# Patient Record
Sex: MALE | Race: WHITE | Employment: OTHER | URBAN - METROPOLITAN AREA
[De-identification: names, ages, dates, MRNs, and addresses within clinical notes are randomized per-mention and may not be internally consistent; named-entity substitution may affect disease eponyms.]

---

## 2017-12-20 ENCOUNTER — HOSPITAL ENCOUNTER (OUTPATIENT)
Dept: ULTRASOUND IMAGING | Age: 67
Discharge: HOME | End: 2017-12-20
Attending: SURGERY
Payer: MEDICARE

## 2017-12-20 ENCOUNTER — HOSPITAL ENCOUNTER (OUTPATIENT)
Dept: CT IMAGING | Age: 67
End: 2017-12-20
Attending: SURGERY
Payer: MEDICARE

## 2017-12-20 DIAGNOSIS — R10.32 BILATERAL GROIN PAIN: ICD-10-CM

## 2017-12-20 DIAGNOSIS — R10.31 BILATERAL GROIN PAIN: ICD-10-CM

## 2017-12-20 PROCEDURE — 76700 US EXAM ABDOM COMPLETE: CPT

## 2018-04-24 ENCOUNTER — HOSPITAL ENCOUNTER (OUTPATIENT)
Dept: GENERAL RADIOLOGY | Age: 68
Discharge: HOME | End: 2018-04-24
Payer: MEDICARE

## 2018-04-24 DIAGNOSIS — J44.1 CHRONIC OBSTRUCTIVE PULMONARY DISEASE WITH ACUTE EXACERBATION (HCC): ICD-10-CM

## 2018-04-24 PROCEDURE — 71046 X-RAY EXAM CHEST 2 VIEWS: CPT

## 2018-04-24 NOTE — PROGRESS NOTES
I tried to contact the pt via phone, but I was unable to reach them. So, I will try him again later.

## 2018-08-20 PROBLEM — L82.0 INFLAMED SEBORRHEIC KERATOSIS: Status: ACTIVE | Noted: 2018-08-20

## 2018-08-20 PROBLEM — B07.9 VIRAL WART ON LEFT THUMB: Status: ACTIVE | Noted: 2018-08-20

## 2018-08-20 PROBLEM — B07.9 VIRAL WART ON FINGER: Status: ACTIVE | Noted: 2018-08-20

## 2019-11-12 ENCOUNTER — HOSPITAL ENCOUNTER (OUTPATIENT)
Dept: GENERAL RADIOLOGY | Age: 69
Discharge: HOME | End: 2019-11-12
Attending: FAMILY MEDICINE
Payer: MEDICARE

## 2019-11-12 DIAGNOSIS — M25.511 ACUTE PAIN OF RIGHT SHOULDER: ICD-10-CM

## 2019-11-12 PROCEDURE — 73030 X-RAY EXAM OF SHOULDER: CPT

## 2021-08-16 PROBLEM — I10 ESSENTIAL HYPERTENSION, BENIGN: Status: ACTIVE | Noted: 2021-08-16

## 2021-08-16 PROBLEM — Z95.5 S/P CORONARY ARTERY STENT PLACEMENT: Status: ACTIVE | Noted: 2021-08-16

## 2021-08-16 PROBLEM — Z01.810 PRE-OPERATIVE CARDIOVASCULAR EXAMINATION: Status: ACTIVE | Noted: 2021-08-16

## 2021-08-16 PROBLEM — Z72.0 TOBACCO ABUSE: Status: ACTIVE | Noted: 2021-08-16

## 2022-03-18 PROBLEM — L82.0 INFLAMED SEBORRHEIC KERATOSIS: Status: ACTIVE | Noted: 2018-08-20

## 2022-03-19 PROBLEM — Z01.810 PRE-OPERATIVE CARDIOVASCULAR EXAMINATION: Status: ACTIVE | Noted: 2021-08-16

## 2022-03-19 PROBLEM — I10 ESSENTIAL HYPERTENSION, BENIGN: Status: ACTIVE | Noted: 2021-08-16

## 2022-03-19 PROBLEM — Z72.0 TOBACCO ABUSE: Status: ACTIVE | Noted: 2021-08-16

## 2022-03-19 PROBLEM — B07.9 VIRAL WART ON LEFT THUMB: Status: ACTIVE | Noted: 2018-08-20

## 2022-03-19 PROBLEM — B07.9 VIRAL WART ON FINGER: Status: ACTIVE | Noted: 2018-08-20

## 2022-03-19 PROBLEM — Z95.5 S/P CORONARY ARTERY STENT PLACEMENT: Status: ACTIVE | Noted: 2021-08-16

## 2022-05-02 ENCOUNTER — HOSPITAL ENCOUNTER (OUTPATIENT)
Dept: MRI IMAGING | Age: 72
Discharge: HOME | End: 2022-05-02
Attending: PHYSICIAN ASSISTANT
Payer: MEDICARE

## 2022-05-02 DIAGNOSIS — H93.11 TINNITUS OF RIGHT EAR: ICD-10-CM

## 2022-05-02 DIAGNOSIS — H90.3 SENSORINEURAL HEARING LOSS, ASYMMETRICAL: ICD-10-CM

## 2022-05-02 PROCEDURE — 70553 MRI BRAIN STEM W/O & W/DYE: CPT

## 2022-05-02 PROCEDURE — A9576 INJ PROHANCE MULTIPACK: HCPCS | Performed by: PHYSICIAN ASSISTANT

## 2022-05-02 PROCEDURE — 74011250636 HC RX REV CODE- 250/636: Performed by: PHYSICIAN ASSISTANT

## 2022-05-02 RX ORDER — SODIUM CHLORIDE 0.9 % (FLUSH) 0.9 %
10 SYRINGE (ML) INJECTION
Status: COMPLETED | OUTPATIENT
Start: 2022-05-02 | End: 2022-05-02

## 2022-05-02 RX ADMIN — Medication 10 ML: at 08:30

## 2022-05-02 RX ADMIN — GADOTERIDOL 15 ML: 279.3 INJECTION, SOLUTION INTRAVENOUS at 08:29

## 2022-08-18 ENCOUNTER — OFFICE VISIT (OUTPATIENT)
Dept: FAMILY MEDICINE CLINIC | Facility: CLINIC | Age: 72
End: 2022-08-18
Payer: MEDICARE

## 2022-08-18 VITALS
TEMPERATURE: 97.8 F | HEIGHT: 70 IN | SYSTOLIC BLOOD PRESSURE: 92 MMHG | DIASTOLIC BLOOD PRESSURE: 76 MMHG | BODY MASS INDEX: 17.61 KG/M2 | HEART RATE: 72 BPM | OXYGEN SATURATION: 96 % | WEIGHT: 123 LBS

## 2022-08-18 DIAGNOSIS — I71.40 ABDOMINAL AORTIC ANEURYSM (AAA) 3.0 CM TO 5.5 CM IN DIAMETER IN MALE: ICD-10-CM

## 2022-08-18 DIAGNOSIS — J44.9 CHRONIC OBSTRUCTIVE PULMONARY DISEASE, UNSPECIFIED COPD TYPE (HCC): ICD-10-CM

## 2022-08-18 DIAGNOSIS — Z72.0 TOBACCO ABUSE: ICD-10-CM

## 2022-08-18 DIAGNOSIS — E78.2 MIXED HYPERLIPIDEMIA: ICD-10-CM

## 2022-08-18 DIAGNOSIS — I10 ESSENTIAL HYPERTENSION, BENIGN: Primary | ICD-10-CM

## 2022-08-18 DIAGNOSIS — R63.4 ABNORMAL WEIGHT LOSS: ICD-10-CM

## 2022-08-18 DIAGNOSIS — I25.10 CORONARY ARTERY DISEASE INVOLVING NATIVE CORONARY ARTERY OF NATIVE HEART WITHOUT ANGINA PECTORIS: ICD-10-CM

## 2022-08-18 LAB
ALBUMIN SERPL-MCNC: 3.8 G/DL (ref 3.2–4.6)
ALBUMIN/GLOB SERPL: 1.1 {RATIO} (ref 1.2–3.5)
ALP SERPL-CCNC: 128 U/L (ref 50–136)
ALT SERPL-CCNC: 17 U/L (ref 12–65)
ANION GAP SERPL CALC-SCNC: 5 MMOL/L (ref 7–16)
AST SERPL-CCNC: 14 U/L (ref 15–37)
BASOPHILS # BLD: 0.1 K/UL (ref 0–0.2)
BASOPHILS NFR BLD: 1 % (ref 0–2)
BILIRUB SERPL-MCNC: 1.1 MG/DL (ref 0.2–1.1)
BUN SERPL-MCNC: 9 MG/DL (ref 8–23)
CALCIUM SERPL-MCNC: 9.2 MG/DL (ref 8.3–10.4)
CHLORIDE SERPL-SCNC: 99 MMOL/L (ref 98–107)
CHOLEST SERPL-MCNC: 117 MG/DL
CO2 SERPL-SCNC: 32 MMOL/L (ref 21–32)
CREAT SERPL-MCNC: 0.9 MG/DL (ref 0.8–1.5)
DIFFERENTIAL METHOD BLD: NORMAL
EOSINOPHIL # BLD: 0.1 K/UL (ref 0–0.8)
EOSINOPHIL NFR BLD: 2 % (ref 0.5–7.8)
ERYTHROCYTE [DISTWIDTH] IN BLOOD BY AUTOMATED COUNT: 13.8 % (ref 11.9–14.6)
GLOBULIN SER CALC-MCNC: 3.6 G/DL (ref 2.3–3.5)
GLUCOSE SERPL-MCNC: 82 MG/DL (ref 65–100)
HCT VFR BLD AUTO: 48.7 % (ref 41.1–50.3)
HDLC SERPL-MCNC: 42 MG/DL (ref 40–60)
HDLC SERPL: 2.8 {RATIO}
HGB BLD-MCNC: 16.2 G/DL (ref 13.6–17.2)
IMM GRANULOCYTES # BLD AUTO: 0 K/UL (ref 0–0.5)
IMM GRANULOCYTES NFR BLD AUTO: 0 % (ref 0–5)
LDLC SERPL CALC-MCNC: 63.2 MG/DL
LYMPHOCYTES # BLD: 1.8 K/UL (ref 0.5–4.6)
LYMPHOCYTES NFR BLD: 23 % (ref 13–44)
MCH RBC QN AUTO: 29.7 PG (ref 26.1–32.9)
MCHC RBC AUTO-ENTMCNC: 33.3 G/DL (ref 31.4–35)
MCV RBC AUTO: 89.4 FL (ref 79.6–97.8)
MONOCYTES # BLD: 0.6 K/UL (ref 0.1–1.3)
MONOCYTES NFR BLD: 8 % (ref 4–12)
NEUTS SEG # BLD: 5.2 K/UL (ref 1.7–8.2)
NEUTS SEG NFR BLD: 66 % (ref 43–78)
NRBC # BLD: 0 K/UL (ref 0–0.2)
PLATELET # BLD AUTO: 213 K/UL (ref 150–450)
PMV BLD AUTO: 10 FL (ref 9.4–12.3)
POTASSIUM SERPL-SCNC: 3.7 MMOL/L (ref 3.5–5.1)
PROT SERPL-MCNC: 7.4 G/DL (ref 6.3–8.2)
RBC # BLD AUTO: 5.45 M/UL (ref 4.23–5.6)
SODIUM SERPL-SCNC: 136 MMOL/L (ref 138–145)
TRIGL SERPL-MCNC: 59 MG/DL (ref 35–150)
VLDLC SERPL CALC-MCNC: 11.8 MG/DL (ref 6–23)
WBC # BLD AUTO: 7.8 K/UL (ref 4.3–11.1)

## 2022-08-18 PROCEDURE — 99214 OFFICE O/P EST MOD 30 MIN: CPT | Performed by: FAMILY MEDICINE

## 2022-08-18 PROCEDURE — 1123F ACP DISCUSS/DSCN MKR DOCD: CPT | Performed by: FAMILY MEDICINE

## 2022-08-18 RX ORDER — ATENOLOL 50 MG/1
50 TABLET ORAL DAILY
Qty: 90 TABLET | Refills: 3 | Status: ON HOLD | OUTPATIENT
Start: 2022-08-18 | End: 2022-09-12 | Stop reason: SDUPTHER

## 2022-08-18 RX ORDER — EZETIMIBE 10 MG/1
10 TABLET ORAL DAILY
Qty: 90 TABLET | Refills: 3 | Status: SHIPPED | OUTPATIENT
Start: 2022-08-18

## 2022-08-18 RX ORDER — FLUTICASONE PROPIONATE AND SALMETEROL 250; 50 UG/1; UG/1
1 POWDER RESPIRATORY (INHALATION) EVERY 12 HOURS
Qty: 1 EACH | Refills: 3 | Status: SHIPPED | OUTPATIENT
Start: 2022-08-18

## 2022-08-18 ASSESSMENT — ENCOUNTER SYMPTOMS
DIARRHEA: 0
DIFFICULTY BREATHING: 1
COUGH: 0
SHORTNESS OF BREATH: 1
VOMITING: 0
NAUSEA: 0

## 2022-08-18 ASSESSMENT — COPD QUESTIONNAIRES: COPD: 1

## 2022-08-18 ASSESSMENT — PATIENT HEALTH QUESTIONNAIRE - PHQ9
1. LITTLE INTEREST OR PLEASURE IN DOING THINGS: 0
SUM OF ALL RESPONSES TO PHQ9 QUESTIONS 1 & 2: 0
SUM OF ALL RESPONSES TO PHQ QUESTIONS 1-9: 0
2. FEELING DOWN, DEPRESSED OR HOPELESS: 0

## 2022-08-18 NOTE — PROGRESS NOTES
breathing and shortness of breath. There is no cough. This is a chronic problem. The current episode started more than 1 year ago. The problem occurs constantly. The problem has been unchanged. Pertinent negatives include no chest pain. Review of Systems   Constitutional:  Negative for chills and fatigue. Respiratory:  Positive for shortness of breath. Negative for cough. Cardiovascular:  Negative for chest pain and leg swelling. Gastrointestinal:  Negative for diarrhea, nausea and vomiting. Objective   Physical Exam  Vitals and nursing note reviewed. Constitutional:       General: He is not in acute distress. Appearance: Normal appearance. HENT:      Head: Normocephalic and atraumatic. Nose: Nose normal.      Mouth/Throat:      Pharynx: Oropharynx is clear. Eyes:      Extraocular Movements: Extraocular movements intact. Conjunctiva/sclera: Conjunctivae normal.   Cardiovascular:      Rate and Rhythm: Normal rate and regular rhythm. Pulses: Normal pulses. Heart sounds: Normal heart sounds. Pulmonary:      Effort: Pulmonary effort is normal.      Breath sounds: Normal breath sounds. Musculoskeletal:         General: No swelling or tenderness. Normal range of motion. Cervical back: Normal range of motion and neck supple. Skin:     General: Skin is warm and dry. Neurological:      General: No focal deficit present. Mental Status: He is alert. Mental status is at baseline. Psychiatric:         Mood and Affect: Mood normal.         Behavior: Behavior normal.              An electronic signature was used to authenticate this note.     --Janine Wong MD

## 2022-08-22 ENCOUNTER — TELEPHONE (OUTPATIENT)
Dept: FAMILY MEDICINE CLINIC | Facility: CLINIC | Age: 72
End: 2022-08-22

## 2022-08-22 NOTE — TELEPHONE ENCOUNTER
----- Message from Derrek Manzo sent at 8/22/2022 10:51 AM EDT -----  Subject: Referral Request    Reason for referral request? Patient would like a referral for CHG CT   [facility] AND PELVIS,W/O CONTRAST sent to [address], [address] [place]) [phone]  Provider patient wants to be referred to(if known):     Provider Phone Number(if known):[phone]    Additional Information for Provider?   ---------------------------------------------------------------------------  --------------  [id number]    [phone]; OK to leave message on voicemail  ---------------------------------------------------------------------------  --------------

## 2022-08-29 ENCOUNTER — TELEPHONE (OUTPATIENT)
Dept: FAMILY MEDICINE CLINIC | Facility: CLINIC | Age: 72
End: 2022-08-29

## 2022-08-29 NOTE — TELEPHONE ENCOUNTER
----- Message from Belen Lynch MD sent at 8/19/2022  7:41 AM EDT -----  SODIUM A LITTLE LOW , REST OF CMP OK  LIPIDS OK/ CBC GOOD  TSH PENDING

## 2022-08-30 DIAGNOSIS — R63.4 ABNORMAL WEIGHT LOSS: ICD-10-CM

## 2022-08-30 DIAGNOSIS — J44.9 CHRONIC OBSTRUCTIVE PULMONARY DISEASE, UNSPECIFIED COPD TYPE (HCC): ICD-10-CM

## 2022-08-30 DIAGNOSIS — Z72.0 TOBACCO ABUSE: ICD-10-CM

## 2022-08-30 DIAGNOSIS — I71.40 ABDOMINAL AORTIC ANEURYSM (AAA) 3.0 CM TO 5.5 CM IN DIAMETER IN MALE: ICD-10-CM

## 2022-09-02 ENCOUNTER — TELEPHONE (OUTPATIENT)
Dept: FAMILY MEDICINE CLINIC | Facility: CLINIC | Age: 72
End: 2022-09-02

## 2022-09-02 DIAGNOSIS — U07.1 COVID-19: Primary | ICD-10-CM

## 2022-09-02 NOTE — TELEPHONE ENCOUNTER
----- Message from Leodan Tello sent at 9/2/2022  8:20 AM EDT -----  Subject: Message to Provider    QUESTIONS  Information for Provider? Patient contacted us this morning stating that   he has tested positive for COVID and wants to be placed on Paxlovid or   other medication to help with the symptoms. Please contact the patient. Thanks!  ---------------------------------------------------------------------------  --------------  Marlon Bruno INFO  [phone]; OK to leave message on voicemail  ---------------------------------------------------------------------------  --------------  SCRIPT ANSWERS  Relationship to Patient?  Self

## 2022-09-02 NOTE — TELEPHONE ENCOUNTER
Tell pt I erxd Paxlovid, but he needs to cut ADVAIR in 1/2 while on this med as they interract negatively, he may use his rescue inhaler

## 2022-09-03 ENCOUNTER — HOSPITAL ENCOUNTER (INPATIENT)
Age: 72
LOS: 8 days | Discharge: NURSING, REHAB OR LONG-TERM CARE | DRG: 193 | End: 2022-09-12
Attending: EMERGENCY MEDICINE | Admitting: HOSPITALIST
Payer: MEDICARE

## 2022-09-03 ENCOUNTER — APPOINTMENT (OUTPATIENT)
Dept: GENERAL RADIOLOGY | Age: 72
DRG: 193 | End: 2022-09-03
Payer: MEDICARE

## 2022-09-03 DIAGNOSIS — U07.1 COVID-19 VIRUS INFECTION: ICD-10-CM

## 2022-09-03 DIAGNOSIS — I10 ESSENTIAL HYPERTENSION, BENIGN: ICD-10-CM

## 2022-09-03 DIAGNOSIS — R06.02 SHORTNESS OF BREATH: Primary | ICD-10-CM

## 2022-09-03 LAB
ALBUMIN SERPL-MCNC: 3.5 G/DL (ref 3.2–4.6)
ALBUMIN/GLOB SERPL: 0.7 {RATIO} (ref 1.2–3.5)
ALP SERPL-CCNC: 106 U/L (ref 50–136)
ALT SERPL-CCNC: 29 U/L (ref 12–65)
ANION GAP SERPL CALC-SCNC: 8 MMOL/L (ref 4–13)
AST SERPL-CCNC: 45 U/L (ref 15–37)
BASOPHILS # BLD: 0.1 K/UL (ref 0–0.2)
BASOPHILS NFR BLD: 0 % (ref 0–2)
BILIRUB SERPL-MCNC: 1.3 MG/DL (ref 0.2–1.1)
BUN SERPL-MCNC: 28 MG/DL (ref 8–23)
CALCIUM SERPL-MCNC: 8.8 MG/DL (ref 8.3–10.4)
CHLORIDE SERPL-SCNC: 101 MMOL/L (ref 101–110)
CO2 SERPL-SCNC: 28 MMOL/L (ref 21–32)
CREAT SERPL-MCNC: 1.5 MG/DL (ref 0.8–1.5)
DIFFERENTIAL METHOD BLD: ABNORMAL
EKG ATRIAL RATE: 136 BPM
EKG DIAGNOSIS: ABNORMAL
EKG P AXIS: 81 DEGREES
EKG P-R INTERVAL: 166 MS
EKG Q-T INTERVAL: 272 MS
EKG QRS DURATION: 85 MS
EKG QTC CALCULATION (BAZETT): 408 MS
EKG R AXIS: 267 DEGREES
EKG T AXIS: 33 DEGREES
EKG VENTRICULAR RATE: 135 BPM
EOSINOPHIL # BLD: 0 K/UL (ref 0–0.8)
EOSINOPHIL NFR BLD: 0 % (ref 0.5–7.8)
ERYTHROCYTE [DISTWIDTH] IN BLOOD BY AUTOMATED COUNT: 14.5 % (ref 11.9–14.6)
GLOBULIN SER CALC-MCNC: 4.8 G/DL (ref 2.3–3.5)
GLUCOSE SERPL-MCNC: 149 MG/DL (ref 65–100)
HCT VFR BLD AUTO: 51.3 % (ref 41.1–50.3)
HGB BLD-MCNC: 17.1 G/DL (ref 13.6–17.2)
IMM GRANULOCYTES # BLD AUTO: 0.7 K/UL (ref 0–0.5)
IMM GRANULOCYTES NFR BLD AUTO: 2 % (ref 0–5)
LACTATE SERPL-SCNC: 2.4 MMOL/L (ref 0.4–2)
LYMPHOCYTES # BLD: 0.7 K/UL (ref 0.5–4.6)
LYMPHOCYTES NFR BLD: 2 % (ref 13–44)
MAGNESIUM SERPL-MCNC: 2.5 MG/DL (ref 1.8–2.4)
MCH RBC QN AUTO: 29.6 PG (ref 26.1–32.9)
MCHC RBC AUTO-ENTMCNC: 33.3 G/DL (ref 31.4–35)
MCV RBC AUTO: 88.8 FL (ref 79.6–97.8)
MONOCYTES # BLD: 2.2 K/UL (ref 0.1–1.3)
MONOCYTES NFR BLD: 7 % (ref 4–12)
NEUTS SEG # BLD: 28 K/UL (ref 1.7–8.2)
NEUTS SEG NFR BLD: 88 % (ref 43–78)
NRBC # BLD: 0 K/UL (ref 0–0.2)
NT PRO BNP: 2502 PG/ML (ref 5–125)
PLATELET # BLD AUTO: 207 K/UL (ref 150–450)
PMV BLD AUTO: 10.3 FL (ref 9.4–12.3)
POTASSIUM SERPL-SCNC: 4 MMOL/L (ref 3.5–5.1)
PROT SERPL-MCNC: 8.3 G/DL (ref 6.3–8.2)
RBC # BLD AUTO: 5.78 M/UL (ref 4.23–5.6)
SODIUM SERPL-SCNC: 137 MMOL/L (ref 138–145)
WBC # BLD AUTO: 31.8 K/UL (ref 4.3–11.1)

## 2022-09-03 PROCEDURE — 93005 ELECTROCARDIOGRAM TRACING: CPT | Performed by: EMERGENCY MEDICINE

## 2022-09-03 PROCEDURE — 87040 BLOOD CULTURE FOR BACTERIA: CPT

## 2022-09-03 PROCEDURE — 6360000002 HC RX W HCPCS: Performed by: EMERGENCY MEDICINE

## 2022-09-03 PROCEDURE — 2500000003 HC RX 250 WO HCPCS: Performed by: EMERGENCY MEDICINE

## 2022-09-03 PROCEDURE — 87635 SARS-COV-2 COVID-19 AMP PRB: CPT

## 2022-09-03 PROCEDURE — 99285 EMERGENCY DEPT VISIT HI MDM: CPT

## 2022-09-03 PROCEDURE — 85025 COMPLETE CBC W/AUTO DIFF WBC: CPT

## 2022-09-03 PROCEDURE — 2580000003 HC RX 258: Performed by: EMERGENCY MEDICINE

## 2022-09-03 PROCEDURE — 80053 COMPREHEN METABOLIC PANEL: CPT

## 2022-09-03 PROCEDURE — 83605 ASSAY OF LACTIC ACID: CPT

## 2022-09-03 PROCEDURE — 71045 X-RAY EXAM CHEST 1 VIEW: CPT

## 2022-09-03 PROCEDURE — 83880 ASSAY OF NATRIURETIC PEPTIDE: CPT

## 2022-09-03 PROCEDURE — 83735 ASSAY OF MAGNESIUM: CPT

## 2022-09-03 PROCEDURE — 96375 TX/PRO/DX INJ NEW DRUG ADDON: CPT

## 2022-09-03 PROCEDURE — 6370000000 HC RX 637 (ALT 250 FOR IP): Performed by: EMERGENCY MEDICINE

## 2022-09-03 PROCEDURE — 96365 THER/PROPH/DIAG IV INF INIT: CPT

## 2022-09-03 RX ORDER — DILTIAZEM HYDROCHLORIDE 5 MG/ML
20 INJECTION INTRAVENOUS
Status: COMPLETED | OUTPATIENT
Start: 2022-09-03 | End: 2022-09-03

## 2022-09-03 RX ORDER — IPRATROPIUM BROMIDE AND ALBUTEROL SULFATE 2.5; .5 MG/3ML; MG/3ML
1 SOLUTION RESPIRATORY (INHALATION) ONCE
Status: COMPLETED | OUTPATIENT
Start: 2022-09-03 | End: 2022-09-03

## 2022-09-03 RX ORDER — 0.9 % SODIUM CHLORIDE 0.9 %
1000 INTRAVENOUS SOLUTION INTRAVENOUS
Status: COMPLETED | OUTPATIENT
Start: 2022-09-03 | End: 2022-09-04

## 2022-09-03 RX ADMIN — DILTIAZEM HYDROCHLORIDE 20 MG: 5 INJECTION INTRAVENOUS at 21:45

## 2022-09-03 RX ADMIN — IPRATROPIUM BROMIDE AND ALBUTEROL SULFATE 1 AMPULE: .5; 3 SOLUTION RESPIRATORY (INHALATION) at 22:20

## 2022-09-03 RX ADMIN — SODIUM CHLORIDE 1000 ML: 9 INJECTION, SOLUTION INTRAVENOUS at 23:29

## 2022-09-03 RX ADMIN — CEFTRIAXONE 1000 MG: 1 INJECTION, POWDER, FOR SOLUTION INTRAMUSCULAR; INTRAVENOUS at 22:33

## 2022-09-03 ASSESSMENT — ENCOUNTER SYMPTOMS
ABDOMINAL PAIN: 0
COUGH: 1
NAUSEA: 0
SHORTNESS OF BREATH: 1
VOMITING: 0
BACK PAIN: 0

## 2022-09-03 ASSESSMENT — PAIN - FUNCTIONAL ASSESSMENT
PAIN_FUNCTIONAL_ASSESSMENT: NONE - DENIES PAIN
PAIN_FUNCTIONAL_ASSESSMENT: NONE - DENIES PAIN

## 2022-09-04 PROBLEM — U07.1 PNEUMONIA DUE TO COVID-19 VIRUS: Status: ACTIVE | Noted: 2022-09-04

## 2022-09-04 PROBLEM — J96.01 ACUTE RESPIRATORY FAILURE WITH HYPOXIA (HCC): Status: ACTIVE | Noted: 2022-09-04

## 2022-09-04 PROBLEM — J13 COMMUNITY ACQUIRED PNEUMONIA DUE TO PNEUMOCOCCUS (HCC): Status: ACTIVE | Noted: 2022-09-04

## 2022-09-04 PROBLEM — J12.82 PNEUMONIA DUE TO COVID-19 VIRUS: Status: ACTIVE | Noted: 2022-09-04

## 2022-09-04 LAB
B PERT DNA SPEC QL NAA+PROBE: NOT DETECTED
BORDETELLA PARAPERTUSSIS BY PCR: NOT DETECTED
C PNEUM DNA SPEC QL NAA+PROBE: NOT DETECTED
FLUAV SUBTYP SPEC NAA+PROBE: NOT DETECTED
FLUBV RNA SPEC QL NAA+PROBE: NOT DETECTED
HADV DNA SPEC QL NAA+PROBE: NOT DETECTED
HCOV 229E RNA SPEC QL NAA+PROBE: NOT DETECTED
HCOV HKU1 RNA SPEC QL NAA+PROBE: NOT DETECTED
HCOV NL63 RNA SPEC QL NAA+PROBE: NOT DETECTED
HCOV OC43 RNA SPEC QL NAA+PROBE: NOT DETECTED
HMPV RNA SPEC QL NAA+PROBE: NOT DETECTED
HPIV1 RNA SPEC QL NAA+PROBE: NOT DETECTED
HPIV2 RNA SPEC QL NAA+PROBE: NOT DETECTED
HPIV3 RNA SPEC QL NAA+PROBE: NOT DETECTED
HPIV4 RNA SPEC QL NAA+PROBE: NOT DETECTED
LACTATE SERPL-SCNC: 2.9 MMOL/L (ref 0.4–2)
M PNEUMO DNA SPEC QL NAA+PROBE: NOT DETECTED
RSV RNA SPEC QL NAA+PROBE: NOT DETECTED
RV+EV RNA SPEC QL NAA+PROBE: NOT DETECTED
SARS-COV-2 RDRP RESP QL NAA+PROBE: NOT DETECTED
SARS-COV-2 RNA RESP QL NAA+PROBE: NOT DETECTED
SOURCE: NORMAL

## 2022-09-04 PROCEDURE — 94760 N-INVAS EAR/PLS OXIMETRY 1: CPT

## 2022-09-04 PROCEDURE — 1100000000 HC RM PRIVATE

## 2022-09-04 PROCEDURE — 6370000000 HC RX 637 (ALT 250 FOR IP): Performed by: HOSPITALIST

## 2022-09-04 PROCEDURE — 2580000003 HC RX 258: Performed by: HOSPITALIST

## 2022-09-04 PROCEDURE — 6360000002 HC RX W HCPCS: Performed by: INTERNAL MEDICINE

## 2022-09-04 PROCEDURE — 94640 AIRWAY INHALATION TREATMENT: CPT

## 2022-09-04 PROCEDURE — 6360000002 HC RX W HCPCS: Performed by: HOSPITALIST

## 2022-09-04 PROCEDURE — 0202U NFCT DS 22 TRGT SARS-COV-2: CPT

## 2022-09-04 PROCEDURE — 83605 ASSAY OF LACTIC ACID: CPT

## 2022-09-04 PROCEDURE — 36415 COLL VENOUS BLD VENIPUNCTURE: CPT

## 2022-09-04 RX ORDER — IPRATROPIUM BROMIDE AND ALBUTEROL SULFATE 2.5; .5 MG/3ML; MG/3ML
1 SOLUTION RESPIRATORY (INHALATION)
Status: DISCONTINUED | OUTPATIENT
Start: 2022-09-04 | End: 2022-09-08

## 2022-09-04 RX ORDER — SODIUM CHLORIDE 0.9 % (FLUSH) 0.9 %
5-40 SYRINGE (ML) INJECTION PRN
Status: DISCONTINUED | OUTPATIENT
Start: 2022-09-04 | End: 2022-09-12 | Stop reason: HOSPADM

## 2022-09-04 RX ORDER — ONDANSETRON 4 MG/1
4 TABLET, ORALLY DISINTEGRATING ORAL EVERY 8 HOURS PRN
Status: DISCONTINUED | OUTPATIENT
Start: 2022-09-04 | End: 2022-09-12 | Stop reason: HOSPADM

## 2022-09-04 RX ORDER — ACETAMINOPHEN 325 MG/1
650 TABLET ORAL EVERY 6 HOURS PRN
Status: DISCONTINUED | OUTPATIENT
Start: 2022-09-04 | End: 2022-09-12 | Stop reason: HOSPADM

## 2022-09-04 RX ORDER — SODIUM CHLORIDE 9 MG/ML
INJECTION, SOLUTION INTRAVENOUS PRN
Status: DISCONTINUED | OUTPATIENT
Start: 2022-09-04 | End: 2022-09-12 | Stop reason: HOSPADM

## 2022-09-04 RX ORDER — ACETAMINOPHEN 650 MG/1
650 SUPPOSITORY RECTAL EVERY 6 HOURS PRN
Status: DISCONTINUED | OUTPATIENT
Start: 2022-09-04 | End: 2022-09-12 | Stop reason: HOSPADM

## 2022-09-04 RX ORDER — ONDANSETRON 2 MG/ML
4 INJECTION INTRAMUSCULAR; INTRAVENOUS EVERY 6 HOURS PRN
Status: DISCONTINUED | OUTPATIENT
Start: 2022-09-04 | End: 2022-09-12 | Stop reason: HOSPADM

## 2022-09-04 RX ORDER — DEXAMETHASONE SODIUM PHOSPHATE 10 MG/ML
6 INJECTION INTRAMUSCULAR; INTRAVENOUS EVERY 24 HOURS
Status: DISCONTINUED | OUTPATIENT
Start: 2022-09-04 | End: 2022-09-04

## 2022-09-04 RX ORDER — METOPROLOL TARTRATE 5 MG/5ML
5 INJECTION INTRAVENOUS EVERY 6 HOURS PRN
Status: DISCONTINUED | OUTPATIENT
Start: 2022-09-04 | End: 2022-09-12 | Stop reason: HOSPADM

## 2022-09-04 RX ORDER — DOXYCYCLINE HYCLATE 100 MG/1
100 CAPSULE ORAL EVERY 12 HOURS SCHEDULED
Status: COMPLETED | OUTPATIENT
Start: 2022-09-04 | End: 2022-09-10

## 2022-09-04 RX ORDER — POLYETHYLENE GLYCOL 3350 17 G/17G
17 POWDER, FOR SOLUTION ORAL DAILY PRN
Status: DISCONTINUED | OUTPATIENT
Start: 2022-09-04 | End: 2022-09-12 | Stop reason: HOSPADM

## 2022-09-04 RX ORDER — ENOXAPARIN SODIUM 100 MG/ML
40 INJECTION SUBCUTANEOUS EVERY 24 HOURS
Status: DISCONTINUED | OUTPATIENT
Start: 2022-09-04 | End: 2022-09-12 | Stop reason: HOSPADM

## 2022-09-04 RX ORDER — SODIUM CHLORIDE 0.9 % (FLUSH) 0.9 %
5-40 SYRINGE (ML) INJECTION EVERY 12 HOURS SCHEDULED
Status: DISCONTINUED | OUTPATIENT
Start: 2022-09-04 | End: 2022-09-12 | Stop reason: HOSPADM

## 2022-09-04 RX ORDER — METHYLPREDNISOLONE SODIUM SUCCINATE 40 MG/ML
40 INJECTION, POWDER, LYOPHILIZED, FOR SOLUTION INTRAMUSCULAR; INTRAVENOUS EVERY 8 HOURS
Status: DISCONTINUED | OUTPATIENT
Start: 2022-09-04 | End: 2022-09-07

## 2022-09-04 RX ADMIN — IPRATROPIUM BROMIDE AND ALBUTEROL SULFATE 1 AMPULE: .5; 3 SOLUTION RESPIRATORY (INHALATION) at 08:25

## 2022-09-04 RX ADMIN — ENOXAPARIN SODIUM 40 MG: 100 INJECTION SUBCUTANEOUS at 09:01

## 2022-09-04 RX ADMIN — DOXYCYCLINE HYCLATE 100 MG: 100 CAPSULE ORAL at 03:13

## 2022-09-04 RX ADMIN — IPRATROPIUM BROMIDE AND ALBUTEROL SULFATE 1 AMPULE: .5; 3 SOLUTION RESPIRATORY (INHALATION) at 20:52

## 2022-09-04 RX ADMIN — METHYLPREDNISOLONE SODIUM SUCCINATE 40 MG: 40 INJECTION, POWDER, FOR SOLUTION INTRAMUSCULAR; INTRAVENOUS at 17:42

## 2022-09-04 RX ADMIN — IPRATROPIUM BROMIDE AND ALBUTEROL SULFATE 1 AMPULE: .5; 3 SOLUTION RESPIRATORY (INHALATION) at 11:38

## 2022-09-04 RX ADMIN — METHYLPREDNISOLONE SODIUM SUCCINATE 40 MG: 40 INJECTION, POWDER, FOR SOLUTION INTRAMUSCULAR; INTRAVENOUS at 09:32

## 2022-09-04 RX ADMIN — DOXYCYCLINE HYCLATE 100 MG: 100 CAPSULE ORAL at 23:45

## 2022-09-04 RX ADMIN — CEFTRIAXONE 1000 MG: 1 INJECTION, POWDER, FOR SOLUTION INTRAMUSCULAR; INTRAVENOUS at 21:46

## 2022-09-04 RX ADMIN — IPRATROPIUM BROMIDE AND ALBUTEROL SULFATE 1 AMPULE: .5; 3 SOLUTION RESPIRATORY (INHALATION) at 15:45

## 2022-09-04 RX ADMIN — DOXYCYCLINE HYCLATE 100 MG: 100 CAPSULE ORAL at 12:12

## 2022-09-04 RX ADMIN — SODIUM CHLORIDE, PRESERVATIVE FREE 10 ML: 5 INJECTION INTRAVENOUS at 21:48

## 2022-09-04 RX ADMIN — SODIUM CHLORIDE, PRESERVATIVE FREE 10 ML: 5 INJECTION INTRAVENOUS at 09:31

## 2022-09-04 RX ADMIN — BARICITINIB 2 MG: 2 TABLET, FILM COATED ORAL at 03:13

## 2022-09-04 NOTE — H&P
Hospitalist History and Physical   Admit Date:  9/3/2022  9:17 PM   Name:  Deborah Bonds   Age:  71 y.o. Sex:  male  :  1950   MRN:  [id number]   Room:  ER04/04    Presenting Complaint: Shortness of Breath     Reason(s) for Admission: Community acquired pneumonia due to Pneumococcus [facility]) Bruno Celestin     History of Present Illness:     33years old  male with past medical history of COPD on supplemental oxygen as needed basis, carotid disease, dyslipidemia, hypertension and diagnosed COVID 2 days ago presented to emergency room with chief complaint of worsening shortness of breath, cough, wheezing. Patient was desaturating to mid 80s, initiated on supplemental oxygen, patient was brought to emergency room for further evaluation. At home patient only using oxygen as needed. Patient denies fever but reports a lysed weakness, shortness of breath, cough, sputum production. Patient was tachycardic, looks acutely ill. Patient reports he was taking atenolol for heart rate for long time. In emergency room chest x-ray shows evidence of infiltrates, white count is significantly elevated and 30,000's, initiated on antibiotics, ER physician requested admission of this patient for further evaluation and management. Review of Systems:  10 systems reviewed and negative except as noted in HPI. Assessment & Plan:       Principal problem:    Acute hypoxic respiratory failure: Initiated on supplemental oxygen, patient was using oxygen as needed basis at home, acute hypoxic respiratory failure likely secondary to COPD exacerbation, COVID, community-acquired pneumonia as well. Principal problem:    Community-acquired pneumonia: Initiated antibiotics with ceftriaxone, doxycycline, will monitor respiratory status. COVID-19 pneumonia: Initiated on dexamethasone, bronchodilator therapy, patient is hypoxic, if patient qualifies will initiate on baricitinib.     Acute exacerbation of COPD: Initiated on bronchodilator therapy, steroids, already on antibiotics. Dyslipidemia, continue statins. Anticipated discharge needs: Likely home with family. Diet: ADULT DIET; Regular; 3 carb choices (45 gm/meal)  VTE ppx: Lovenox  Code status: Full Code    Hospital Problems:  Principal Problem:    Community acquired pneumonia due to Pneumococcus [facility])  Active Problems:    Acute respiratory failure with hypoxia ([facility]    Pneumonia due to COVID-19 virus    Mixed hyperlipidemia    COPD with acute exacerbation ([facility]  Resolved Problems:    * No resolved hospital problems.  *       Past History:     Past Medical History:   Diagnosis Date    Acute bronchitis     CAD (coronary artery disease)     COPD (chronic obstructive pulmonary disease) (HCC)     Eczema     ED (erectile dysfunction)     Edema     Emphysema/COPD (HCC)     Hypercholesterolemia     Hypertension     Joint pain     Leg cramps     Osteoarthritis     Osteoarthritis of cervical spine     Tobacco use disorder     Warts     Weight loss        Past Surgical History:   Procedure Laterality Date    CARDIAC CATHETERIZATION      3    CATARACT REMOVAL Bilateral     CORONARY ANGIOPLASTY WITH STENT PLACEMENT      multiple stents     HERNIA REPAIR      double-inguinal    ORTHOPEDIC SURGERY      spine surgery (TWICE)    PTCA          Social History     Tobacco Use    Smoking status: Every Day     Packs/day: 1.00     Types: Cigarettes    Smokeless tobacco: Former   Substance Use Topics    Alcohol use: Yes      Social History     Substance and Sexual Activity   Drug Use No       Family History   Problem Relation Age of Onset    Cancer Mother         liver    No Known Problems Father         Immunization History   Administered Date(s) Administered    COVID-19, PFIZER PURPLE top, DILUTE for use, (age 15 y+), 30mcg/0.3mL 03/04/2021, 03/25/2021    Influenza, High Dose (Fluzone 65 yrs and older) 10/25/2017, 11/14/2018, 12/22/2021    Influenza, Triv, inactivated, subunit, adjuvanted, IM (Fluad 65 yrs and older) 11/11/2019, 12/17/2020    Pneumococcal Conjugate 13-valent ([id number]) 10/25/2017    Pneumococcal Polysaccharide ([id number]) 02/08/2019     No Known Allergies  Prior to Admit Medications:  Current Outpatient Medications   Medication Instructions    albuterol sulfate  (90 Base) MCG/ACT inhaler 2 puffs, Inhalation, EVERY 6 HOURS PRN    aspirin 81 mg, Oral    atenolol (TENORMIN) 50 mg, Oral, DAILY, Take 1 tablet by mouth one time daily. atorvastatin (LIPITOR) 40 mg, Oral, DAILY    ezetimibe (ZETIA) 10 mg, Oral, DAILY    fluticasone-salmeterol (ADVAIR) 250-50 MCG/ACT AEPB diskus inhaler 1 puff, Inhalation, EVERY 12 HOURS    nirmatrelvir/ritonavir (PAXLOVID) 20 x 150 MG & 10 x 100MG TBPK Take 3 tablets (two 150 mg nirmatrelvir and one 100 mg ritonavir tablets) by mouth every 12 hours for 5 days. nitroGLYCERIN (NITROSTAT) 0.4 mg, SubLINGual    predniSONE (DELTASONE) 20 MG tablet Take 3 tablets by mouth daily for 4 days, then 2 tablets by mouth daily for 2 days, then 1 tablets by mouth daily for 2 days, then 1/2 tablet by mouth daily for 2 days. Objective:   Patient Vitals for the past 24 hrs:   Temp Pulse Resp BP SpO2   09/03/22 2251 -- -- -- -- 95 %   09/03/22 2245 -- (!) 117 -- 126/83 94 %   09/03/22 2230 -- (!) 117 -- 130/83 97 %   09/03/22 2212 -- (!) 119 -- 117/86 97 %   09/03/22 2205 -- (!) 118 20 -- 96 %   09/03/22 2124 99.1 °F (37.3 °C) (!) 135 24 (!) 163/102 90 %       Oxygen Therapy  SpO2: 95 %  Pulse Oximeter Device Mode: Continuous  O2 Device: Nasal cannula  O2 Flow Rate (L/min): 4 L/min    Estimated body mass index is 17.51 kg/m² as calculated from the following:    Height as of this encounter: 5' 10\" (1.778 m). Weight as of this encounter: 122 lb (55.3 kg).     Intake/Output Summary (Last 24 hours) at 9/4/2022 0030  Last data filed at 9/3/2022 2303  Gross per 24 hour   Intake 50 ml   Output --   Net 50 ml         Physical Exam:    Blood pressure 126/83, pulse (!) 117, temperature 99.1 °F (37.3 °C), temperature source Oral, resp. rate 20, height 5' 10\" (1.778 m), weight 122 lb (55.3 kg), SpO2 95 %. General:    Well nourished. Head:  Normocephalic, atraumatic  Eyes:  Sclerae appear normal.  Pupils equally round. ENT:  Nares appear normal, no drainage. Moist oral mucosa  Neck:  No restricted ROM. Trachea midline   CV:   RRR. No m/r/g. No jugular venous distension. Lungs:   Diffuse rhonchi noted. Abdomen: Bowel sounds present. Soft, nontender, nondistended. Extremities: No cyanosis or clubbing. No edema  Skin:     No rashes and normal coloration. Warm and dry. Neuro:  CN II-XII grossly intact. Sensation intact. A&Ox3  Psych:  Normal mood and affect.       I have personally reviewed labs and tests showing:  Recent Labs:  Recent Results (from the past 24 hour(s))   EKG 12 Lead    Collection Time: 09/03/22  9:28 PM   Result Value Ref Range    Ventricular Rate 135 BPM    Atrial Rate 136 BPM    P-R Interval 166 ms    QRS Duration 85 ms    Q-T Interval 272 ms    QTc Calculation (Bazett) 408 ms    P Axis 81 degrees    R Axis 267 degrees    T Axis 33 degrees    Diagnosis Sinus tachycardia    CBC with Auto Differential    Collection Time: 09/03/22  9:29 PM   Result Value Ref Range    WBC 31.8 (H) 4.3 - 11.1 K/uL    RBC 5.78 (H) 4.23 - 5.6 M/uL    Hemoglobin 17.1 13.6 - 17.2 g/dL    Hematocrit 51.3 (H) 41.1 - 50.3 %    MCV 88.8 79.6 - 97.8 FL    MCH 29.6 26.1 - 32.9 PG    MCHC 33.3 31.4 - 35.0 g/dL    RDW 14.5 11.9 - 14.6 %    Platelets 458 083 - 532 K/uL    MPV 10.3 9.4 - 12.3 FL    nRBC 0.00 0.0 - 0.2 K/uL    Differential Type AUTOMATED      Seg Neutrophils 88 (H) 43 - 78 %    Lymphocytes 2 (L) 13 - 44 %    Monocytes 7 4.0 - 12.0 %    Eosinophils % 0 (L) 0.5 - 7.8 %    Basophils 0 0.0 - 2.0 %    Immature Granulocytes 2 0.0 - 5.0 %    Segs Absolute 28.0 (H) 1.7 - 8.2 K/UL    Absolute Lymph # 0.7 0.5 - 4.6 K/UL    Absolute Mono # 2.2 (H) 0.1 - 1.3 K/UL    Absolute Eos # 0.0 0.0 - 0.8 K/UL    Basophils Absolute 0.1 0.0 - 0.2 K/UL    Absolute Immature Granulocyte 0.7 (H) 0.0 - 0.5 K/UL   Comprehensive Metabolic Panel    Collection Time: 09/03/22  9:29 PM   Result Value Ref Range    Sodium 137 (L) 138 - 145 mmol/L    Potassium 4.0 3.5 - 5.1 mmol/L    Chloride 101 101 - 110 mmol/L    CO2 28 21 - 32 mmol/L    Anion Gap 8 4 - 13 mmol/L    Glucose 149 (H) 65 - 100 mg/dL    BUN 28 (H) 8 - 23 MG/DL    Creatinine 1.50 0.8 - 1.5 MG/DL    GFR  59 (L) >60 ml/min/1.73m2    GFR Non- 49 (L) >60 ml/min/1.73m2    Calcium 8.8 8.3 - 10.4 MG/DL    Total Bilirubin 1.3 (H) 0.2 - 1.1 MG/DL    ALT 29 12 - 65 U/L    AST 45 (H) 15 - 37 U/L    Alk Phosphatase 106 50 - 136 U/L    Total Protein 8.3 (H) 6.3 - 8.2 g/dL    Albumin 3.5 3.2 - 4.6 g/dL    Globulin 4.8 (H) 2.3 - 3.5 g/dL    Albumin/Globulin Ratio 0.7 (L) 1.2 - 3.5     Magnesium    Collection Time: 09/03/22  9:29 PM   Result Value Ref Range    Magnesium 2.5 (H) 1.8 - 2.4 mg/dL   Brain Natriuretic Peptide    Collection Time: 09/03/22  9:29 PM   Result Value Ref Range    NT Pro-BNP 2,502 (H) 5 - 125 PG/ML   Lactic Acid    Collection Time: 09/03/22  9:44 PM   Result Value Ref Range    Lactic Acid, Plasma 2.4 (H) 0.4 - 2.0 MMOL/L       I have personally reviewed imaging studies showing:  XR CHEST PORTABLE    Result Date: 9/3/2022  Portable chest xray  COMPARISON: April 2018 INDICATION: Shortness of breath FINDINGS: There is emphysema. There are bibasilar interstitial densities. There is no pneumothorax. No large pleural effusion. Cardiomediastinal silhouette is within normal limits. Surrounding bones are intact. 1. Bibasilar interstitial densities, nonspecific but may be secondary to aspiration, atelectasis or edema. 2. Emphysema.        Echocardiogram:  09/23/21    TRANSTHORACIC ECHOCARDIOGRAM (TTE) COMPLETE (CONTRAST/BUBBLE/3D PRN) 09/23/2021  5:33 PM (Final)    Narrative  This is a summary report. The complete report is available in the patient's medical record. If you cannot access the medical record, please contact the sending organization for a detailed fax or copy. · Image quality for this study was technically difficult. · LV: Estimated LVEF is 50 - 55%. Normal cavity size, wall thickness and systolic function (ejection fraction normal). Abnormal left ventricular septal motion consistent with interventricular conduction delay (IVCD).     Signed by: Keon Sanchez on 9/23/2021  5:33 PM        Orders Placed This Encounter   Medications    dilTIAZem injection 20 mg    DISCONTD: cefTRIAXone (ROCEPHIN) 1,000 mg in sodium chloride 0.9 % 50 mL IVPB mini-bag     Order Specific Question:   Antimicrobial Indications     Answer:   Pneumonia (CAP)    ipratropium-albuterol (DUONEB) nebulizer solution 1 ampule     Order Specific Question:   Initiate RT Bronchodilator Protocol     Answer:   Yes - ED protocol    0.9 % sodium chloride bolus    sodium chloride flush 0.9 % injection 5-40 mL    sodium chloride flush 0.9 % injection 5-40 mL    0.9 % sodium chloride infusion    enoxaparin (LOVENOX) injection 40 mg     Order Specific Question:   Indication of Use     Answer:   Prophylaxis-DVT/PE    OR Linked Order Group     ondansetron (ZOFRAN-ODT) disintegrating tablet 4 mg     ondansetron (ZOFRAN) injection 4 mg    polyethylene glycol (GLYCOLAX) packet 17 g    OR Linked Order Group     acetaminophen (TYLENOL) tablet 650 mg     acetaminophen (TYLENOL) suppository 650 mg    AND Linked Order Group     cefTRIAXone (ROCEPHIN) 1,000 mg in sodium chloride 0.9 % 50 mL IVPB mini-bag      Order Specific Question:   Antimicrobial Indications      Answer:   Pneumonia (CAP)      Order Specific Question:   CAP duration of therapy      Answer:   7 days     doxycycline hyclate (VIBRAMYCIN) capsule 100 mg      Order Specific Question:   Antimicrobial Indications      Answer:   Pneumonia (CAP) Order Specific Question:   CAP duration of therapy      Answer:   7 days    ipratropium-albuterol (DUONEB) nebulizer solution 1 ampule     Order Specific Question:   Initiate RT Bronchodilator Protocol     Answer:   Yes - Inpatient Protocol    dexamethasone (DECADRON) injection 6 mg    baricitinib (OLUMIANT) tablet 2 mg         Signed:  Moni Roman MD    Part of this note may have been written by using a voice dictation software. The note has been proof read but may still contain some grammatical/other typographical errors.

## 2022-09-04 NOTE — ED TRIAGE NOTES
Pt presents from EMS for SOB, he is COVID+ since Wednesday, worsening shortness of breath today, non productive cough. Given duoneb and 125 solumedrol by medic. Initial RA sat at 86%, now 93% on 2 L.  Audible rhonchi Chart(s)/Patient

## 2022-09-04 NOTE — PROGRESS NOTES
Patient is sleeping in bed. Respirations even and unlabored on 3L NC. No needs expressed at this time. Call light within reach. Will prepare to give report to oncoming RN.

## 2022-09-04 NOTE — PROGRESS NOTES
Patient arrival on the floor. Patient oriented to the room. Patient is awake resting in bed. Alert and oriented times 4. Respirations even and unlabored on 3L nasal cannula. Pt belongings at bedside. No distress noted at this time. No needs expressed. Safety measures in place. Call light within reach. Will continue to monitor.

## 2022-09-04 NOTE — PROGRESS NOTES
Reviewed notes for new spiritual concerns. PREFERRED NAME -  BABITA    FULL CODE    HX COPD    PNEUMONIA                  Will follow as needed.

## 2022-09-04 NOTE — PROGRESS NOTES
TRANSFER - IN REPORT:    Verbal report received from [facility], RN on Tom Cea  being received from ED for routine progression of patient care      Report consisted of patient's Situation, Background, Assessment and   Recommendations(SBAR). Information from the following report(s) Nurse Handoff Report was reviewed with the receiving nurse. Opportunity for questions and clarification was provided. Assessment completed upon patient's arrival to unit and care assumed.

## 2022-09-04 NOTE — ED PROVIDER NOTES
[facility] Emergency Department Provider Note                   PCP:                Deisi Lopez MD               Age: 71 y.o. Sex: male     No diagnosis found. DISPOSITION          MDM  Number of Diagnoses or Management Options  Diagnosis management comments: EKG is limited by artifact but shows sinus tachycardia at 135 no definite ST changes. Chest x-ray shows bibasilar interstitial densities possibly due to aspiration versus atelectasis or edema. Blood work shows white blood count 31.8, hemoglobin 17 MetaGrip 51. Basic panel unremarkable. BNP 2502. Patient was initially treated with Cardizem as there was concern that his shortness of breath may be related to that tachycardia. Patient also treated with another albuterol nebulizer treatment. Heart rate has improved to around 115. He is still dyspneic but appears to be improving. Hospitalist consulted for admission.        Amount and/or Complexity of Data Reviewed  Clinical lab tests: ordered and reviewed  Tests in the radiology section of CPT®: ordered and reviewed  Tests in the medicine section of CPT®: ordered and reviewed  Discuss the patient with other providers: yes  Independent visualization of images, tracings, or specimens: yes    Risk of Complications, Morbidity, and/or Mortality  Presenting problems: high  Diagnostic procedures: high  Management options: high         Orders Placed This Encounter   Procedures    Culture, Blood 1    Culture, Blood 1    XR CHEST PORTABLE    CBC with Auto Differential    Comprehensive Metabolic Panel    Magnesium    Lactic Acid    Brain Natriuretic Peptide    Cardiac Monitor - ED Only    Continuous Pulse Oximetry    EKG 12 Lead        Medications   cefTRIAXone (ROCEPHIN) 1,000 mg in sodium chloride 0.9 % 50 mL IVPB mini-bag (has no administration in time range)   ipratropium-albuterol (DUONEB) nebulizer solution 1 ampule (has no administration in time range)   dilTIAZem injection 20 mg (20 STENT PLACEMENT      multiple stents     HERNIA REPAIR      double-inguinal    ORTHOPEDIC SURGERY      spine surgery (TWICE)    PTCA          Family History   Problem Relation Age of Onset    Cancer Mother         liver    No Known Problems Father         Social History     Socioeconomic History    Marital status: Single     Spouse name: None    Number of children: None    Years of education: None    Highest education level: None   Tobacco Use    Smoking status: Every Day     Packs/day: 1.00     Types: Cigarettes    Smokeless tobacco: Former   Vaping Use    Vaping Use: Never used   Substance and Sexual Activity    Alcohol use: Yes    Drug use: No         Patient has no known allergies. Previous Medications    ALBUTEROL SULFATE  (90 BASE) MCG/ACT INHALER    Inhale 2 puffs into the lungs every 6 hours as needed    ASPIRIN 325 MG TABLET    Take 81 mg by mouth    ATENOLOL (TENORMIN) 50 MG TABLET    Take 1 tablet by mouth daily Take 1 tablet by mouth one time daily. ATORVASTATIN (LIPITOR) 40 MG TABLET    Take 40 mg by mouth daily    EZETIMIBE (ZETIA) 10 MG TABLET    Take 1 tablet by mouth daily    FLUTICASONE-SALMETEROL (ADVAIR) 250-50 MCG/ACT AEPB DISKUS INHALER    Inhale 1 puff into the lungs in the morning and 1 puff in the evening. NIRMATRELVIR/RITONAVIR (PAXLOVID) 20 X 150 MG & 10 X 100MG TBPK    Take 3 tablets (two 150 mg nirmatrelvir and one 100 mg ritonavir tablets) by mouth every 12 hours for 5 days. NITROGLYCERIN (NITROSTAT) 0.4 MG SL TABLET    Place 0.4 mg under the tongue    PREDNISONE (DELTASONE) 20 MG TABLET    Take 3 tablets by mouth daily for 4 days, then 2 tablets by mouth daily for 2 days, then 1 tablets by mouth daily for 2 days, then 1/2 tablet by mouth daily for 2 days. Vitals signs and nursing note reviewed.    Patient Vitals for the past 4 hrs:   Temp Pulse Resp BP SpO2   09/03/22 2205 -- (!) 118 20 -- 96 %   09/03/22 2124 99.1 °F (37.3 °C) (!) 135 24 (!) 163/102 90 %          Physical Exam  Vitals and nursing note reviewed. Constitutional:       Appearance: Normal appearance. He is ill-appearing. Comments: Course breath sounds and increased respiratory effort   HENT:      Head: Normocephalic and atraumatic. Nose: Nose normal.      Mouth/Throat:      Mouth: Mucous membranes are moist.      Pharynx: Oropharynx is clear. Eyes:      Pupils: Pupils are equal, round, and reactive to light. Cardiovascular:      Rate and Rhythm: Regular rhythm. Tachycardia present. Pulmonary:      Comments: Increased respiratory effort and audible rhonchi  Abdominal:      General: There is no distension. Palpations: Abdomen is soft. Tenderness: There is no abdominal tenderness. Musculoskeletal:         General: No swelling. Normal range of motion. Cervical back: Normal range of motion and neck supple. Skin:     General: Skin is warm and dry. Neurological:      Mental Status: He is alert and oriented to person, place, and time. Psychiatric:         Mood and Affect: Mood normal.         Behavior: Behavior normal.        EKG 12 Lead    Date/Time: 9/3/2022 10:11 PM  Performed by: Baldo Chanel MD  Authorized by:  Shaylee Boo MD     ECG reviewed by ED Physician in the absence of a cardiologist: yes    Interpretation:     Interpretation: abnormal    Quality:     Tracing quality:  Limited by artifact  Rate:     ECG rate:  135    ECG rate assessment: tachycardic    Rhythm:     Rhythm: sinus tachycardia    Ectopy:     Ectopy: none    QRS:     QRS axis:  Normal  ST segments:     ST segments:  Elevation  T waves:     T waves: normal    Other findings:     Other findings: CEZAR        Results Include:    Recent Results (from the past 24 hour(s))   EKG 12 Lead    Collection Time: 09/03/22  9:28 PM   Result Value Ref Range    Ventricular Rate 135 BPM    Atrial Rate 136 BPM    P-R Interval 166 ms    QRS Duration 85 ms    Q-T Interval 272 ms    QTc Calculation (Jacob) 408 ms    P Axis 81 degrees    R Axis 267 degrees    T Axis 33 degrees    Diagnosis Sinus tachycardia    CBC with Auto Differential    Collection Time: 09/03/22  9:29 PM   Result Value Ref Range    WBC 31.8 (H) 4.3 - 11.1 K/uL    RBC 5.78 (H) 4.23 - 5.6 M/uL    Hemoglobin 17.1 13.6 - 17.2 g/dL    Hematocrit 51.3 (H) 41.1 - 50.3 %    MCV 88.8 79.6 - 97.8 FL    MCH 29.6 26.1 - 32.9 PG    MCHC 33.3 31.4 - 35.0 g/dL    RDW 14.5 11.9 - 14.6 %    Platelets 306 341 - 297 K/uL    MPV 10.3 9.4 - 12.3 FL    nRBC 0.00 0.0 - 0.2 K/uL    Differential Type AUTOMATED      Seg Neutrophils 88 (H) 43 - 78 %    Lymphocytes 2 (L) 13 - 44 %    Monocytes 7 4.0 - 12.0 %    Eosinophils % 0 (L) 0.5 - 7.8 %    Basophils 0 0.0 - 2.0 %    Immature Granulocytes 2 0.0 - 5.0 %    Segs Absolute 28.0 (H) 1.7 - 8.2 K/UL    Absolute Lymph # 0.7 0.5 - 4.6 K/UL    Absolute Mono # 2.2 (H) 0.1 - 1.3 K/UL    Absolute Eos # 0.0 0.0 - 0.8 K/UL    Basophils Absolute 0.1 0.0 - 0.2 K/UL    Absolute Immature Granulocyte 0.7 (H) 0.0 - 0.5 K/UL   Comprehensive Metabolic Panel    Collection Time: 09/03/22  9:29 PM   Result Value Ref Range    Sodium 137 (L) 138 - 145 mmol/L    Potassium 4.0 3.5 - 5.1 mmol/L    Chloride 101 101 - 110 mmol/L    CO2 28 21 - 32 mmol/L    Anion Gap 8 4 - 13 mmol/L    Glucose 149 (H) 65 - 100 mg/dL    BUN 28 (H) 8 - 23 MG/DL    Creatinine 1.50 0.8 - 1.5 MG/DL    GFR  59 (L) >60 ml/min/1.73m2    GFR Non- 49 (L) >60 ml/min/1.73m2    Calcium 8.8 8.3 - 10.4 MG/DL    Total Bilirubin 1.3 (H) 0.2 - 1.1 MG/DL    ALT 29 12 - 65 U/L    AST 45 (H) 15 - 37 U/L    Alk Phosphatase 106 50 - 136 U/L    Total Protein 8.3 (H) 6.3 - 8.2 g/dL    Albumin 3.5 3.2 - 4.6 g/dL    Globulin 4.8 (H) 2.3 - 3.5 g/dL    Albumin/Globulin Ratio 0.7 (L) 1.2 - 3.5     Magnesium    Collection Time: 09/03/22  9:29 PM   Result Value Ref Range    Magnesium 2.5 (H) 1.8 - 2.4 mg/dL   Brain Natriuretic Peptide    Collection Time: 09/03/22 9:29 PM   Result Value Ref Range    NT Pro-BNP 2,502 (H) 5 - 125 PG/ML          XR CHEST PORTABLE   Final Result      1. Bibasilar interstitial densities, nonspecific but may be secondary to   aspiration, atelectasis or edema. 2. Emphysema. Voice dictation software was used during the making of this note. This software is not perfect and grammatical and other typographical errors may be present. This note has not been completely proofread for errors.      Anand Mckeon MD  09/03/22 2948

## 2022-09-04 NOTE — FLOWSHEET NOTE
Patient on 5 L NC. Safety measures noted. Will continue to monitor per policy.      09/04/22 0710   Handoff   Communication Given Shift Handoff   Handoff Received From EFFIE Mills/EFFIE Strickland   Handoff Communication Face to Face   Time Handoff Given [id number]

## 2022-09-05 LAB
ALBUMIN SERPL-MCNC: 2.7 G/DL (ref 3.2–4.6)
ALBUMIN/GLOB SERPL: 0.6 {RATIO} (ref 1.2–3.5)
ALP SERPL-CCNC: 81 U/L (ref 50–136)
ALT SERPL-CCNC: 32 U/L (ref 12–65)
ANION GAP SERPL CALC-SCNC: 4 MMOL/L (ref 4–13)
AST SERPL-CCNC: 31 U/L (ref 15–37)
BASOPHILS # BLD: 0 K/UL (ref 0–0.2)
BASOPHILS NFR BLD: 0 % (ref 0–2)
BILIRUB SERPL-MCNC: 0.5 MG/DL (ref 0.2–1.1)
BUN SERPL-MCNC: 36 MG/DL (ref 8–23)
CALCIUM SERPL-MCNC: 9.2 MG/DL (ref 8.3–10.4)
CHLORIDE SERPL-SCNC: 102 MMOL/L (ref 101–110)
CO2 SERPL-SCNC: 32 MMOL/L (ref 21–32)
CREAT SERPL-MCNC: 1.1 MG/DL (ref 0.8–1.5)
DIFFERENTIAL METHOD BLD: ABNORMAL
EOSINOPHIL # BLD: 0 K/UL (ref 0–0.8)
EOSINOPHIL NFR BLD: 0 % (ref 0.5–7.8)
ERYTHROCYTE [DISTWIDTH] IN BLOOD BY AUTOMATED COUNT: 14.6 % (ref 11.9–14.6)
GLOBULIN SER CALC-MCNC: 4.3 G/DL (ref 2.3–3.5)
GLUCOSE SERPL-MCNC: 114 MG/DL (ref 65–100)
HCT VFR BLD AUTO: 44.6 % (ref 41.1–50.3)
HGB BLD-MCNC: 14.9 G/DL (ref 13.6–17.2)
IMM GRANULOCYTES # BLD AUTO: 0 K/UL (ref 0–0.5)
IMM GRANULOCYTES NFR BLD AUTO: 0 % (ref 0–5)
LACTATE SERPL-SCNC: 1.1 MMOL/L (ref 0.4–2)
LYMPHOCYTES # BLD: 0.3 K/UL (ref 0.5–4.6)
LYMPHOCYTES NFR BLD: 2 % (ref 13–44)
MAGNESIUM SERPL-MCNC: 2.5 MG/DL (ref 1.8–2.4)
MCH RBC QN AUTO: 30.3 PG (ref 26.1–32.9)
MCHC RBC AUTO-ENTMCNC: 33.4 G/DL (ref 31.4–35)
MCV RBC AUTO: 90.7 FL (ref 79.6–97.8)
MONOCYTES # BLD: 0.3 K/UL (ref 0.1–1.3)
MONOCYTES NFR BLD: 3 % (ref 4–12)
NEUTS SEG # BLD: 11.8 K/UL (ref 1.7–8.2)
NEUTS SEG NFR BLD: 95 % (ref 43–78)
NRBC # BLD: 0 K/UL (ref 0–0.2)
PLATELET # BLD AUTO: 149 K/UL (ref 150–450)
PMV BLD AUTO: 10.1 FL (ref 9.4–12.3)
POTASSIUM SERPL-SCNC: 3.2 MMOL/L (ref 3.5–5.1)
PROT SERPL-MCNC: 7 G/DL (ref 6.3–8.2)
RBC # BLD AUTO: 4.92 M/UL (ref 4.23–5.6)
SODIUM SERPL-SCNC: 138 MMOL/L (ref 138–145)
WBC # BLD AUTO: 12.5 K/UL (ref 4.3–11.1)

## 2022-09-05 PROCEDURE — 6360000002 HC RX W HCPCS: Performed by: HOSPITALIST

## 2022-09-05 PROCEDURE — 6370000000 HC RX 637 (ALT 250 FOR IP): Performed by: HOSPITALIST

## 2022-09-05 PROCEDURE — 2580000003 HC RX 258: Performed by: HOSPITALIST

## 2022-09-05 PROCEDURE — 2500000003 HC RX 250 WO HCPCS: Performed by: HOSPITALIST

## 2022-09-05 PROCEDURE — 36415 COLL VENOUS BLD VENIPUNCTURE: CPT

## 2022-09-05 PROCEDURE — 6370000000 HC RX 637 (ALT 250 FOR IP): Performed by: FAMILY MEDICINE

## 2022-09-05 PROCEDURE — 1100000000 HC RM PRIVATE

## 2022-09-05 PROCEDURE — 2700000000 HC OXYGEN THERAPY PER DAY

## 2022-09-05 PROCEDURE — 80053 COMPREHEN METABOLIC PANEL: CPT

## 2022-09-05 PROCEDURE — 83735 ASSAY OF MAGNESIUM: CPT

## 2022-09-05 PROCEDURE — 85025 COMPLETE CBC W/AUTO DIFF WBC: CPT

## 2022-09-05 PROCEDURE — 83605 ASSAY OF LACTIC ACID: CPT

## 2022-09-05 PROCEDURE — 94640 AIRWAY INHALATION TREATMENT: CPT

## 2022-09-05 PROCEDURE — 6360000002 HC RX W HCPCS: Performed by: INTERNAL MEDICINE

## 2022-09-05 PROCEDURE — 6370000000 HC RX 637 (ALT 250 FOR IP): Performed by: NURSE PRACTITIONER

## 2022-09-05 PROCEDURE — 94760 N-INVAS EAR/PLS OXIMETRY 1: CPT

## 2022-09-05 RX ORDER — IPRATROPIUM BROMIDE AND ALBUTEROL SULFATE 2.5; .5 MG/3ML; MG/3ML
1 SOLUTION RESPIRATORY (INHALATION) EVERY 4 HOURS PRN
Status: DISCONTINUED | OUTPATIENT
Start: 2022-09-05 | End: 2022-09-12 | Stop reason: HOSPADM

## 2022-09-05 RX ORDER — POTASSIUM CHLORIDE 20 MEQ/1
40 TABLET, EXTENDED RELEASE ORAL ONCE
Status: COMPLETED | OUTPATIENT
Start: 2022-09-05 | End: 2022-09-05

## 2022-09-05 RX ORDER — ATENOLOL 25 MG/1
50 TABLET ORAL DAILY
Status: DISCONTINUED | OUTPATIENT
Start: 2022-09-05 | End: 2022-09-06

## 2022-09-05 RX ORDER — EZETIMIBE 10 MG/1
10 TABLET ORAL NIGHTLY
Status: DISCONTINUED | OUTPATIENT
Start: 2022-09-05 | End: 2022-09-12 | Stop reason: HOSPADM

## 2022-09-05 RX ORDER — ATORVASTATIN CALCIUM 40 MG/1
40 TABLET, FILM COATED ORAL NIGHTLY
Status: DISCONTINUED | OUTPATIENT
Start: 2022-09-05 | End: 2022-09-12 | Stop reason: HOSPADM

## 2022-09-05 RX ORDER — ASPIRIN 81 MG/1
81 TABLET, CHEWABLE ORAL DAILY
Status: DISCONTINUED | OUTPATIENT
Start: 2022-09-05 | End: 2022-09-12 | Stop reason: HOSPADM

## 2022-09-05 RX ORDER — GUAIFENESIN 600 MG/1
600 TABLET, EXTENDED RELEASE ORAL 2 TIMES DAILY
Status: DISCONTINUED | OUTPATIENT
Start: 2022-09-05 | End: 2022-09-12 | Stop reason: HOSPADM

## 2022-09-05 RX ADMIN — METHYLPREDNISOLONE SODIUM SUCCINATE 40 MG: 40 INJECTION, POWDER, FOR SOLUTION INTRAMUSCULAR; INTRAVENOUS at 08:55

## 2022-09-05 RX ADMIN — SODIUM CHLORIDE, PRESERVATIVE FREE 10 ML: 5 INJECTION INTRAVENOUS at 08:56

## 2022-09-05 RX ADMIN — IPRATROPIUM BROMIDE AND ALBUTEROL SULFATE 1 AMPULE: .5; 3 SOLUTION RESPIRATORY (INHALATION) at 11:33

## 2022-09-05 RX ADMIN — ASPIRIN 81 MG: 81 TABLET, CHEWABLE ORAL at 21:20

## 2022-09-05 RX ADMIN — METOPROLOL TARTRATE 5 MG: 5 INJECTION INTRAVENOUS at 17:39

## 2022-09-05 RX ADMIN — ENOXAPARIN SODIUM 40 MG: 100 INJECTION SUBCUTANEOUS at 08:55

## 2022-09-05 RX ADMIN — POTASSIUM CHLORIDE 40 MEQ: 1500 TABLET, EXTENDED RELEASE ORAL at 08:54

## 2022-09-05 RX ADMIN — SODIUM CHLORIDE, PRESERVATIVE FREE 10 ML: 5 INJECTION INTRAVENOUS at 21:24

## 2022-09-05 RX ADMIN — DOXYCYCLINE HYCLATE 100 MG: 100 CAPSULE ORAL at 12:15

## 2022-09-05 RX ADMIN — MOMETASONE FUROATE AND FORMOTEROL FUMARATE DIHYDRATE 2 PUFF: 200; 5 AEROSOL RESPIRATORY (INHALATION) at 20:51

## 2022-09-05 RX ADMIN — ATORVASTATIN CALCIUM 40 MG: 40 TABLET, FILM COATED ORAL at 21:23

## 2022-09-05 RX ADMIN — GUAIFENESIN 600 MG: 600 TABLET ORAL at 12:14

## 2022-09-05 RX ADMIN — METOPROLOL TARTRATE 5 MG: 5 INJECTION INTRAVENOUS at 04:50

## 2022-09-05 RX ADMIN — METHYLPREDNISOLONE SODIUM SUCCINATE 40 MG: 40 INJECTION, POWDER, FOR SOLUTION INTRAMUSCULAR; INTRAVENOUS at 17:35

## 2022-09-05 RX ADMIN — GUAIFENESIN 600 MG: 600 TABLET ORAL at 21:20

## 2022-09-05 RX ADMIN — IPRATROPIUM BROMIDE AND ALBUTEROL SULFATE 1 AMPULE: .5; 3 SOLUTION RESPIRATORY (INHALATION) at 16:55

## 2022-09-05 RX ADMIN — EZETIMIBE 10 MG: 10 TABLET ORAL at 21:20

## 2022-09-05 RX ADMIN — METHYLPREDNISOLONE SODIUM SUCCINATE 40 MG: 40 INJECTION, POWDER, FOR SOLUTION INTRAMUSCULAR; INTRAVENOUS at 01:08

## 2022-09-05 RX ADMIN — ATENOLOL 50 MG: 25 TABLET ORAL at 21:20

## 2022-09-05 RX ADMIN — IPRATROPIUM BROMIDE AND ALBUTEROL SULFATE 1 AMPULE: .5; 3 SOLUTION RESPIRATORY (INHALATION) at 08:18

## 2022-09-05 RX ADMIN — DOXYCYCLINE HYCLATE 100 MG: 100 CAPSULE ORAL at 23:56

## 2022-09-05 RX ADMIN — IPRATROPIUM BROMIDE AND ALBUTEROL SULFATE 1 AMPULE: .5; 3 SOLUTION RESPIRATORY (INHALATION) at 20:51

## 2022-09-05 RX ADMIN — CEFTRIAXONE 1000 MG: 1 INJECTION, POWDER, FOR SOLUTION INTRAMUSCULAR; INTRAVENOUS at 21:30

## 2022-09-05 NOTE — PROGRESS NOTES
Hospitalist Progress Note   Admit Date:  9/3/2022  9:17 PM   Name:  Maria M Pratt   Age:  71 y.o. Sex:  male  :  1950   MRN:  [id number]   Room:  [facility]/    Presenting Complaint: Shortness of Breath    Reason(s) for Admission: Shortness of breath [R06.02]  Community acquired pneumonia due to Pneumococcus ([address] Yoel Staple  COVID-19 virus infection [U07.1]     Hospital Course & Interval History:   71years old  male with past medical history of COPD on supplemental oxygen as needed basis, carotid disease, dyslipidemia, hypertension and diagnosed COVID 2 days ago presented to emergency room with chief complaint of worsening shortness of breath, cough, wheezing. Patient was desaturating to mid 80s, initiated on supplemental oxygen. Subjective/24hr Events (22): Sob+, cough+    ROS:  10 systems reviewed and negative except as noted above. Assessment & Plan:   Acute hypoxic respiratory failure: I  supplemental oxygen, patient was using oxygen as needed basis at home, acute hypoxic respiratory failure likely secondary to COPD exacerbation, community-acquired pneumonia as well. Principal problem:     community-acquired pneumonia:  ceftriaxone, doxycycline, will monitor respiratory status. Acute exacerbation of COPD:   -duonebs  -iv solumedrol  -O2. Dyslipidemia, continue statins. Diet:  ADULT DIET; Regular; 3 carb choices (45 gm/meal);  Chop Meats - patient has no teeth  DVT PPx: lovenox  Code status: [unfilled]    @[facility]    Objective:   Patient Vitals for the past 24 hrs:   Temp Pulse Resp BP SpO2   22 -- 99 18 -- 96 %   22 97.5 °F (36.4 °C) (!) 103 20 139/85 96 %   22 1548 -- -- -- -- 94 %   22 1544 98.1 °F (36.7 °C) (!) 103 16 (!) 141/80 95 %   22 1154 97.3 °F (36.3 °C) (!) 103 16 130/80 98 %   22 1141 -- -- -- -- 91 %   22 0811 97.5 °F (36.4 °C) 82 16 138/86 96 %   22 0530 97.9 °F (36.6 °C) 89 16 (!) 140/84 97 %   09/04/22 0400 -- 88 -- 110/69 95 %   09/04/22 0356 -- 70 -- -- 95 %   09/04/22 0345 -- 86 -- (!) 138/91 94 %   09/04/22 0310 -- 93 -- -- 94 %   09/04/22 0255 -- 100 -- 128/80 98 %   09/04/22 0240 -- 90 -- 129/75 98 %   09/04/22 0222 -- 92 -- -- 98 %   09/04/22 0215 -- 90 -- 102/63 95 %   09/04/22 0200 -- 99 -- 133/78 --   09/04/22 0115 -- (!) 109 -- 139/84 96 %   09/04/22 0100 -- (!) 106 -- 128/79 98 %   09/04/22 0045 -- (!) 107 -- 132/87 98 %   09/04/22 0030 -- (!) 108 -- 123/81 97 %   09/03/22 2251 -- -- -- -- 95 %   09/03/22 2245 -- (!) 117 -- 126/83 94 %   09/03/22 2230 -- (!) 117 -- 130/83 97 %   09/03/22 2212 -- (!) 119 -- 117/86 97 %   09/03/22 2205 -- (!) 118 20 -- 96 %     @BSHSIFLOW(2444:last)@    Estimated body mass index is 17.51 kg/m² as calculated from the following:    Height as of this encounter: 5' 10\" (1.778 m). Weight as of this encounter: 122 lb (55.3 kg). Intake/Output Summary (Last 24 hours) at 9/4/2022 2150  Last data filed at 9/4/2022 1834  Gross per 24 hour   Intake 60 ml   Output 625 ml   Net -565 ml         Physical Exam:     Blood pressure 139/85, pulse 99, temperature 97.5 °F (36.4 °C), temperature source Axillary, resp. rate 18, height 5' 10\" (1.778 m), weight 122 lb (55.3 kg), SpO2 96 %. General:    Well nourished. No overt distress  Head:  Normocephalic, atraumatic  Eyes:  Sclerae appear normal.  Pupils equally round. ENT:  Nares appear normal, no drainage. Moist oral mucosa  Neck:  No restricted ROM. Trachea midline   CV:   RRR. No m/r/g. No jugular venous distension. Lungs:   Scattered ronchi+  Abdomen: Bowel sounds present. Soft, nontender, nondistended. Extremities: No cyanosis or clubbing. No edema  Skin:     No rashes and normal coloration. Warm and dry. Neuro:  CN II-XII grossly intact. Sensation intact. A&Ox3  Psych:  Normal mood and affect.       I have reviewed ordered lab tests and independently visualized imaging below:    Recent Labs:  Recent Results (from the past 48 hour(s))   EKG 12 Lead    Collection Time: 09/03/22  9:28 PM   Result Value Ref Range    Ventricular Rate 135 BPM    Atrial Rate 136 BPM    P-R Interval 166 ms    QRS Duration 85 ms    Q-T Interval 272 ms    QTc Calculation (Bazett) 408 ms    P Axis 81 degrees    R Axis 267 degrees    T Axis 33 degrees    Diagnosis Sinus tachycardia    CBC with Auto Differential    Collection Time: 09/03/22  9:29 PM   Result Value Ref Range    WBC 31.8 (H) 4.3 - 11.1 K/uL    RBC 5.78 (H) 4.23 - 5.6 M/uL    Hemoglobin 17.1 13.6 - 17.2 g/dL    Hematocrit 51.3 (H) 41.1 - 50.3 %    MCV 88.8 79.6 - 97.8 FL    MCH 29.6 26.1 - 32.9 PG    MCHC 33.3 31.4 - 35.0 g/dL    RDW 14.5 11.9 - 14.6 %    Platelets 078 923 - 583 K/uL    MPV 10.3 9.4 - 12.3 FL    nRBC 0.00 0.0 - 0.2 K/uL    Differential Type AUTOMATED      Seg Neutrophils 88 (H) 43 - 78 %    Lymphocytes 2 (L) 13 - 44 %    Monocytes 7 4.0 - 12.0 %    Eosinophils % 0 (L) 0.5 - 7.8 %    Basophils 0 0.0 - 2.0 %    Immature Granulocytes 2 0.0 - 5.0 %    Segs Absolute 28.0 (H) 1.7 - 8.2 K/UL    Absolute Lymph # 0.7 0.5 - 4.6 K/UL    Absolute Mono # 2.2 (H) 0.1 - 1.3 K/UL    Absolute Eos # 0.0 0.0 - 0.8 K/UL    Basophils Absolute 0.1 0.0 - 0.2 K/UL    Absolute Immature Granulocyte 0.7 (H) 0.0 - 0.5 K/UL   Comprehensive Metabolic Panel    Collection Time: 09/03/22  9:29 PM   Result Value Ref Range    Sodium 137 (L) 138 - 145 mmol/L    Potassium 4.0 3.5 - 5.1 mmol/L    Chloride 101 101 - 110 mmol/L    CO2 28 21 - 32 mmol/L    Anion Gap 8 4 - 13 mmol/L    Glucose 149 (H) 65 - 100 mg/dL    BUN 28 (H) 8 - 23 MG/DL    Creatinine 1.50 0.8 - 1.5 MG/DL    GFR  59 (L) >60 ml/min/1.73m2    GFR Non- 49 (L) >60 ml/min/1.73m2    Calcium 8.8 8.3 - 10.4 MG/DL    Total Bilirubin 1.3 (H) 0.2 - 1.1 MG/DL    ALT 29 12 - 65 U/L    AST 45 (H) 15 - 37 U/L    Alk Phosphatase 106 50 - 136 U/L    Total Protein 8.3 (H) 6.3 - 8.2 g/dL    Albumin 3.5 3.2 - 4.6 g/dL    Globulin 4.8 (H) 2.3 - 3.5 g/dL    Albumin/Globulin Ratio 0.7 (L) 1.2 - 3.5     Magnesium    Collection Time: 09/03/22  9:29 PM   Result Value Ref Range    Magnesium 2.5 (H) 1.8 - 2.4 mg/dL   Brain Natriuretic Peptide    Collection Time: 09/03/22  9:29 PM   Result Value Ref Range    NT Pro-BNP 2,502 (H) 5 - 125 PG/ML   Lactic Acid    Collection Time: 09/03/22  9:44 PM   Result Value Ref Range    Lactic Acid, Plasma 2.4 (H) 0.4 - 2.0 MMOL/L   Culture, Blood 1    Collection Time: 09/03/22 10:34 PM    Specimen: Blood   Result Value Ref Range    Special Requests NO SPECIAL REQUESTS  RIGHT  FOREARM        Culture NO GROWTH AFTER 6 HOURS     Culture, Blood 1    Collection Time: 09/03/22 10:34 PM    Specimen: Blood   Result Value Ref Range    Special Requests NO SPECIAL REQUESTS  LEFT  Antecubital        Culture NO GROWTH AFTER 6 HOURS     COVID-19, Rapid    Collection Time: 09/03/22 11:30 PM    Specimen: Nasopharyngeal   Result Value Ref Range    Source NASAL      SARS-CoV-2, Rapid Not detected NOTD     Respiratory Panel, Molecular, with COVID-19 (Restricted: peds pts or suitable admitted adults)    Collection Time: 09/04/22  1:06 AM    Specimen: Nasopharyngeal   Result Value Ref Range    Adenovirus by PCR NOT DETECTED NOTDET      Coronavirus 229E by PCR NOT DETECTED NOTDET      Coronavirus HKU1 by PCR NOT DETECTED NOTDET      Coronavirus NL63 by PCR NOT DETECTED NOTDET      Coronavirus OC43 by PCR NOT DETECTED NOTDET      SARS-CoV-2, PCR NOT DETECTED NOTDET      Human Metapneumovirus by PCR NOT DETECTED NOTDET      Rhinovirus Enterovirus PCR NOT DETECTED NOTDET      Influenza A by PCR NOT DETECTED NOTDET      INFLUENZA B PCR NOT DETECTED NOTDET      Parainfluenza 1 PCR NOT DETECTED NOTDET      Parainfluenza 2 PCR NOT DETECTED NOTDET      Parainfluenza 3 PCR NOT DETECTED NOTDET      Parainfluenza 4 PCR NOT DETECTED NOTDET      Respiratory Syncytial Virus by PCR NOT DETECTED NOTDET      Bordetella parapertussis by PCR NOT DETECTED NOTDET      Bordetella pertussis by PCR NOT DETECTED NOTDET      Chlamydophila Pneumonia PCR NOT DETECTED NOTDET      Mycoplasma pneumo by PCR NOT DETECTED NOTDET     Lactic Acid    Collection Time: 09/04/22  6:38 AM   Result Value Ref Range    Lactic Acid, Plasma 2.9 (H) 0.4 - 2.0 MMOL/L       [unfilled]    Other Studies:  [unfilled]    Current Meds:  Current Facility-Administered Medications   Medication Dose Route Frequency    sodium chloride flush 0.9 % injection 5-40 mL  5-40 mL IntraVENous 2 times per day    sodium chloride flush 0.9 % injection 5-40 mL  5-40 mL IntraVENous PRN    0.9 % sodium chloride infusion   IntraVENous PRN    enoxaparin (LOVENOX) injection 40 mg  40 mg SubCUTAneous Q24H    ondansetron (ZOFRAN-ODT) disintegrating tablet 4 mg  4 mg Oral Q8H PRN    Or    ondansetron (ZOFRAN) injection 4 mg  4 mg IntraVENous Q6H PRN    polyethylene glycol (GLYCOLAX) packet 17 g  17 g Oral Daily PRN    acetaminophen (TYLENOL) tablet 650 mg  650 mg Oral Q6H PRN    Or    acetaminophen (TYLENOL) suppository 650 mg  650 mg Rectal Q6H PRN    cefTRIAXone (ROCEPHIN) 1,000 mg in sodium chloride 0.9 % 50 mL IVPB mini-bag  1,000 mg IntraVENous Q24H    And    doxycycline hyclate (VIBRAMYCIN) capsule 100 mg  100 mg Oral 2 times per day    ipratropium-albuterol (DUONEB) nebulizer solution 1 ampule  1 ampule Inhalation Q4H WA    baricitinib (OLUMIANT) tablet 2 mg  2 mg Oral Daily    metoprolol (LOPRESSOR) injection 5 mg  5 mg IntraVENous Q6H PRN    methylPREDNISolone sodium (SOLU-MEDROL) injection 40 mg  40 mg IntraVENous Q8H       Signed:  Marianela Cherry MD    Part of this note may have been written by using a voice dictation software. The note has been proof read but may still contain some grammatical/other typographical errors.

## 2022-09-05 NOTE — PROGRESS NOTES
Patient is alert and oriented X4. Respirations are even and unlabored. On 4 Lpm NC. Denies pain. Bed is low, locked and call light within reach.

## 2022-09-05 NOTE — FLOWSHEET NOTE
Patient on 4 L NC. Safety measures noted. Will continue to monitor per policy.      09/05/22 0776   Handoff   Communication Given Shift Handoff   Handoff Received From EFFIE Craft   Handoff Communication Face to Face   Time Handoff Given [id number]

## 2022-09-05 NOTE — PROGRESS NOTES
Hospitalist Progress Note   Admit Date:  9/3/2022  9:17 PM   Name:  Jeronimo Hatfield   Age:  71 y.o. Sex:  male  :  1950   MRN:  [id number]   Room:  0/    Presenting Complaint: Shortness of Breath    Reason(s) for Admission: Shortness of breath [R06.02]  Community acquired pneumonia due to Pneumococcus [facility]) [place]  COVID-19 virus infection [U07.1]     Hospital Course & Interval History:   Jeronimo Hatfield is a 71years old  male with past medical history of COPD on supplemental oxygen as needed basis, carotid disease, dyslipidemia, hypertension who presented to emergency room with chief complaint of worsening shortness of breath, cough, wheezing. Patient was desaturating to mid 80s, initiated on supplemental oxygen. Reports testing COVID positive 2 days PTA but negative per RVP    Subjective/24hr Events (22):  Endorses some continued SOB and cough. Denies CP, fever, chills     ROS:  10 systems reviewed and negative except as noted above. Assessment & Plan:   Acute hypoxic respiratory failure: I  22  -Likely 2/2 CAP/COPD exacerbation 2/2 continued tobacco use. Endorses 1 PPD habit   -supplemental oxygen at 4L with sats in mid 90s; wean as able   -patient was using oxygen as needed at home   -Smoking cessation advised   -RVP panel negative; Bcx NGTD     Principal problem:  Community-acquired pneumonia:  22  -ceftriaxone, doxycycline, will monitor respiratory status.  -Pulmonary toilet  -Leukocytosis improving     Hypokalemia  22  -Replace  -BMP in a.m.; Mg WNL     Acute exacerbation of COPD:   22  -duonebs  -iv solumedrol  -O2 wean as able   -Pulmonary toilet   -PT/OT    Tobacco Use  -Nicotine patch      Dyslipidemia  -statin     Diet:  ADULT DIET; Regular; 3 carb choices (45 gm/meal);  Chop Meats - patient has no teeth  DVT PPx: lovenox  Code status: FULL CODE        Objective:   Patient Vitals for the past 24 hrs:   Temp Pulse Resp BP SpO2   22 1126 97.4 °F Interval 166 ms    QRS Duration 85 ms    Q-T Interval 272 ms    QTc Calculation (Bazett) 408 ms    P Axis 81 degrees    R Axis 267 degrees    T Axis 33 degrees    Diagnosis Sinus tachycardia    CBC with Auto Differential    Collection Time: 09/03/22  9:29 PM   Result Value Ref Range    WBC 31.8 (H) 4.3 - 11.1 K/uL    RBC 5.78 (H) 4.23 - 5.6 M/uL    Hemoglobin 17.1 13.6 - 17.2 g/dL    Hematocrit 51.3 (H) 41.1 - 50.3 %    MCV 88.8 79.6 - 97.8 FL    MCH 29.6 26.1 - 32.9 PG    MCHC 33.3 31.4 - 35.0 g/dL    RDW 14.5 11.9 - 14.6 %    Platelets 833 666 - 373 K/uL    MPV 10.3 9.4 - 12.3 FL    nRBC 0.00 0.0 - 0.2 K/uL    Differential Type AUTOMATED      Seg Neutrophils 88 (H) 43 - 78 %    Lymphocytes 2 (L) 13 - 44 %    Monocytes 7 4.0 - 12.0 %    Eosinophils % 0 (L) 0.5 - 7.8 %    Basophils 0 0.0 - 2.0 %    Immature Granulocytes 2 0.0 - 5.0 %    Segs Absolute 28.0 (H) 1.7 - 8.2 K/UL    Absolute Lymph # 0.7 0.5 - 4.6 K/UL    Absolute Mono # 2.2 (H) 0.1 - 1.3 K/UL    Absolute Eos # 0.0 0.0 - 0.8 K/UL    Basophils Absolute 0.1 0.0 - 0.2 K/UL    Absolute Immature Granulocyte 0.7 (H) 0.0 - 0.5 K/UL   Comprehensive Metabolic Panel    Collection Time: 09/03/22  9:29 PM   Result Value Ref Range    Sodium 137 (L) 138 - 145 mmol/L    Potassium 4.0 3.5 - 5.1 mmol/L    Chloride 101 101 - 110 mmol/L    CO2 28 21 - 32 mmol/L    Anion Gap 8 4 - 13 mmol/L    Glucose 149 (H) 65 - 100 mg/dL    BUN 28 (H) 8 - 23 MG/DL    Creatinine 1.50 0.8 - 1.5 MG/DL    GFR  59 (L) >60 ml/min/1.73m2    GFR Non- 49 (L) >60 ml/min/1.73m2    Calcium 8.8 8.3 - 10.4 MG/DL    Total Bilirubin 1.3 (H) 0.2 - 1.1 MG/DL    ALT 29 12 - 65 U/L    AST 45 (H) 15 - 37 U/L    Alk Phosphatase 106 50 - 136 U/L    Total Protein 8.3 (H) 6.3 - 8.2 g/dL    Albumin 3.5 3.2 - 4.6 g/dL    Globulin 4.8 (H) 2.3 - 3.5 g/dL    Albumin/Globulin Ratio 0.7 (L) 1.2 - 3.5     Magnesium    Collection Time: 09/03/22  9:29 PM   Result Value Ref Range    Magnesium Lactic Acid    Collection Time: 09/04/22  6:38 AM   Result Value Ref Range    Lactic Acid, Plasma 2.9 (H) 0.4 - 2.0 MMOL/L   Comprehensive Metabolic Panel w/ Reflex to MG    Collection Time: 09/05/22  6:20 AM   Result Value Ref Range    Sodium 138 138 - 145 mmol/L    Potassium 3.2 (L) 3.5 - 5.1 mmol/L    Chloride 102 101 - 110 mmol/L    CO2 32 21 - 32 mmol/L    Anion Gap 4 4 - 13 mmol/L    Glucose 114 (H) 65 - 100 mg/dL    BUN 36 (H) 8 - 23 MG/DL    Creatinine 1.10 0.8 - 1.5 MG/DL    GFR African American >60 >60 ml/min/1.73m2    GFR Non- >60 >60 ml/min/1.73m2    Calcium 9.2 8.3 - 10.4 MG/DL    Total Bilirubin 0.5 0.2 - 1.1 MG/DL    ALT 32 12 - 65 U/L    AST 31 15 - 37 U/L    Alk Phosphatase 81 50 - 136 U/L    Total Protein 7.0 6.3 - 8.2 g/dL    Albumin 2.7 (L) 3.2 - 4.6 g/dL    Globulin 4.3 (H) 2.3 - 3.5 g/dL    Albumin/Globulin Ratio 0.6 (L) 1.2 - 3.5     CBC with Auto Differential    Collection Time: 09/05/22  6:20 AM   Result Value Ref Range    WBC 12.5 (H) 4.3 - 11.1 K/uL    RBC 4.92 4.23 - 5.6 M/uL    Hemoglobin 14.9 13.6 - 17.2 g/dL    Hematocrit 44.6 41.1 - 50.3 %    MCV 90.7 79.6 - 97.8 FL    MCH 30.3 26.1 - 32.9 PG    MCHC 33.4 31.4 - 35.0 g/dL    RDW 14.6 11.9 - 14.6 %    Platelets 841 (L) 609 - 450 K/uL    MPV 10.1 9.4 - 12.3 FL    nRBC 0.00 0.0 - 0.2 K/uL    Differential Type AUTOMATED      Seg Neutrophils 95 (H) 43 - 78 %    Lymphocytes 2 (L) 13 - 44 %    Monocytes 3 (L) 4.0 - 12.0 %    Eosinophils % 0 (L) 0.5 - 7.8 %    Basophils 0 0.0 - 2.0 %    Immature Granulocytes 0 0.0 - 5.0 %    Segs Absolute 11.8 (H) 1.7 - 8.2 K/UL    Absolute Lymph # 0.3 (L) 0.5 - 4.6 K/UL    Absolute Mono # 0.3 0.1 - 1.3 K/UL    Absolute Eos # 0.0 0.0 - 0.8 K/UL    Basophils Absolute 0.0 0.0 - 0.2 K/UL    Absolute Immature Granulocyte 0.0 0.0 - 0.5 K/UL   Lactic Acid    Collection Time: 09/05/22  6:20 AM   Result Value Ref Range    Lactic Acid, Plasma 1.1 0.4 - 2.0 MMOL/L   Magnesium    Collection Time: 09/05/22  6:20 AM   Result Value Ref Range    Magnesium 2.5 (H) 1.8 - 2.4 mg/dL       [unfilled]    Other Studies:  [unfilled]    Current Meds:  Current Facility-Administered Medications   Medication Dose Route Frequency    guaiFENesin (MUCINEX) extended release tablet 600 mg  600 mg Oral BID    ipratropium-albuterol (DUONEB) nebulizer solution 1 ampule  1 ampule Inhalation Q4H PRN    sodium chloride flush 0.9 % injection 5-40 mL  5-40 mL IntraVENous 2 times per day    sodium chloride flush 0.9 % injection 5-40 mL  5-40 mL IntraVENous PRN    0.9 % sodium chloride infusion   IntraVENous PRN    enoxaparin (LOVENOX) injection 40 mg  40 mg SubCUTAneous Q24H    ondansetron (ZOFRAN-ODT) disintegrating tablet 4 mg  4 mg Oral Q8H PRN    Or    ondansetron (ZOFRAN) injection 4 mg  4 mg IntraVENous Q6H PRN    polyethylene glycol (GLYCOLAX) packet 17 g  17 g Oral Daily PRN    acetaminophen (TYLENOL) tablet 650 mg  650 mg Oral Q6H PRN    Or    acetaminophen (TYLENOL) suppository 650 mg  650 mg Rectal Q6H PRN    cefTRIAXone (ROCEPHIN) 1,000 mg in sodium chloride 0.9 % 50 mL IVPB mini-bag  1,000 mg IntraVENous Q24H    And    doxycycline hyclate (VIBRAMYCIN) capsule 100 mg  100 mg Oral 2 times per day    ipratropium-albuterol (DUONEB) nebulizer solution 1 ampule  1 ampule Inhalation Q4H WA    metoprolol (LOPRESSOR) injection 5 mg  5 mg IntraVENous Q6H PRN    methylPREDNISolone sodium (SOLU-MEDROL) injection 40 mg  40 mg IntraVENous Q8H       Signed:  Corina Elias, VICKI - CNP    Part of this note may have been written by using a voice dictation software. The note has been proof read but may still contain some grammatical/other typographical errors.

## 2022-09-05 NOTE — PROGRESS NOTES
No acute change this shift. A&Ox4. On 4 Lpm NC. Lopressor 5mg IV given for elevated HR and BP. Rounded on hourly. Bed is low, locked  and call light within reach.

## 2022-09-05 NOTE — PROGRESS NOTES
Comprehensive Nutrition Assessment    Type and Reason for Visit: Positive Nutrition Screen  Malnutrition Screening Tool: Malnutrition Screen  Have you recently lost weight without trying?: 14 to 23 pounds (2 points)  Have you been eating poorly because of a decreased appetite?: Yes (1 point)  Malnutrition Screening Tool Score: 3    Nutrition Recommendations/Plan:   Meals and Snacks:  Diet: Continue current order  Nutrition Supplement Therapy:  Medical food supplement therapy:  Initiate Ensure Enlive three times per day (this provides 350 kcal and 20 grams protein per bottle)     Malnutrition Assessment:  Malnutrition Status: Severe malnutrition  Context: Chronic Illness  Findings of clinical characteristics of malnutrition:   Energy Intake:  Unable to assess  Weight Loss:  10% over 6 months     Body Fat Loss:  Severe body fat loss Triceps   Muscle Mass Loss:  Severe muscle mass loss Temples (temporalis), Clavicles (pectoralis & deltoids), Scapula (trapezius)  Fluid Accumulation:  No significant fluid accumulation     Strength:  Not Performed    Nutrition Assessment:  Nutrition History:   Pt reports baseline intake of 1 meal per day with 2 small snacks per day. Pt reports noticable decrease in intake and appetite over last several months. Pt has lost ~17 #'s in last 6 months (58.5 kg per OV on 3/11/22 - 50.9 kg on 9/5/22)(~13% BW loss in last 6 mos). Pt denies any supplement use at baseline. Do You Have Any Cultural, Evangelical, or Ethnic Food Preferences?: No     Nutrition Background: PMH: COPD on supplemental O2 PRN @ baseline, carotid disease, dyslipidemia, HTN, + COVID test 2 days PTA (RVP neg upo admission). Pt presents with labored breathing, tachycardia, and O2 sat in mid 80's. Supplemental O2 initiated upon arrival. Chest xray reveals infiltrates. Pt admitted with acute hypoxic respiratory failure r/t community acquired Pneumonia. Nutrition Interval:  Pt seen in bed with friend at bedside.  Pt reports Active Goal: Meet at least 75% of estimated needs, by next RD assessment       Nutrition Monitoring and Evaluation:   Behavioral-Environmental Outcomes: Beliefs and Attitutes  Food/Nutrient Intake Outcomes: Food and Nutrient Intake, Supplement Intake  Physical Signs/Symptoms Outcomes: Meal Time Behavior, Nutrition Focused Physical Findings, Weight    Discharge Planning:    Continue Oral Nutrition Supplement, Continue current diet    Dav Kennedy MS, RD, LD

## 2022-09-06 ENCOUNTER — APPOINTMENT (OUTPATIENT)
Dept: GENERAL RADIOLOGY | Age: 72
DRG: 193 | End: 2022-09-06
Payer: MEDICARE

## 2022-09-06 PROBLEM — E43 SEVERE PROTEIN-CALORIE MALNUTRITION (HCC): Chronic | Status: ACTIVE | Noted: 2022-09-06

## 2022-09-06 PROCEDURE — 94760 N-INVAS EAR/PLS OXIMETRY 1: CPT

## 2022-09-06 PROCEDURE — 6370000000 HC RX 637 (ALT 250 FOR IP): Performed by: FAMILY MEDICINE

## 2022-09-06 PROCEDURE — 97162 PT EVAL MOD COMPLEX 30 MIN: CPT

## 2022-09-06 PROCEDURE — 2700000000 HC OXYGEN THERAPY PER DAY

## 2022-09-06 PROCEDURE — 6360000002 HC RX W HCPCS: Performed by: INTERNAL MEDICINE

## 2022-09-06 PROCEDURE — 6370000000 HC RX 637 (ALT 250 FOR IP): Performed by: HOSPITALIST

## 2022-09-06 PROCEDURE — 94761 N-INVAS EAR/PLS OXIMETRY MLT: CPT

## 2022-09-06 PROCEDURE — 6370000000 HC RX 637 (ALT 250 FOR IP): Performed by: NURSE PRACTITIONER

## 2022-09-06 PROCEDURE — 97112 NEUROMUSCULAR REEDUCATION: CPT

## 2022-09-06 PROCEDURE — 71045 X-RAY EXAM CHEST 1 VIEW: CPT

## 2022-09-06 PROCEDURE — 2580000003 HC RX 258: Performed by: HOSPITALIST

## 2022-09-06 PROCEDURE — 6360000002 HC RX W HCPCS: Performed by: HOSPITALIST

## 2022-09-06 PROCEDURE — 1100000000 HC RM PRIVATE

## 2022-09-06 PROCEDURE — 97165 OT EVAL LOW COMPLEX 30 MIN: CPT

## 2022-09-06 PROCEDURE — 97530 THERAPEUTIC ACTIVITIES: CPT

## 2022-09-06 PROCEDURE — 94640 AIRWAY INHALATION TREATMENT: CPT

## 2022-09-06 RX ORDER — ATENOLOL 25 MG/1
25 TABLET ORAL DAILY
Status: DISCONTINUED | OUTPATIENT
Start: 2022-09-07 | End: 2022-09-07

## 2022-09-06 RX ADMIN — MOMETASONE FUROATE AND FORMOTEROL FUMARATE DIHYDRATE 2 PUFF: 200; 5 AEROSOL RESPIRATORY (INHALATION) at 08:03

## 2022-09-06 RX ADMIN — METHYLPREDNISOLONE SODIUM SUCCINATE 40 MG: 40 INJECTION, POWDER, FOR SOLUTION INTRAMUSCULAR; INTRAVENOUS at 08:42

## 2022-09-06 RX ADMIN — SODIUM CHLORIDE, PRESERVATIVE FREE 5 ML: 5 INJECTION INTRAVENOUS at 21:11

## 2022-09-06 RX ADMIN — ASPIRIN 81 MG: 81 TABLET, CHEWABLE ORAL at 08:42

## 2022-09-06 RX ADMIN — EZETIMIBE 10 MG: 10 TABLET ORAL at 21:10

## 2022-09-06 RX ADMIN — ATENOLOL 50 MG: 25 TABLET ORAL at 08:42

## 2022-09-06 RX ADMIN — METHYLPREDNISOLONE SODIUM SUCCINATE 40 MG: 40 INJECTION, POWDER, FOR SOLUTION INTRAMUSCULAR; INTRAVENOUS at 01:30

## 2022-09-06 RX ADMIN — GUAIFENESIN 600 MG: 600 TABLET ORAL at 21:10

## 2022-09-06 RX ADMIN — IPRATROPIUM BROMIDE AND ALBUTEROL SULFATE 1 AMPULE: .5; 3 SOLUTION RESPIRATORY (INHALATION) at 11:31

## 2022-09-06 RX ADMIN — METHYLPREDNISOLONE SODIUM SUCCINATE 40 MG: 40 INJECTION, POWDER, FOR SOLUTION INTRAMUSCULAR; INTRAVENOUS at 17:24

## 2022-09-06 RX ADMIN — ENOXAPARIN SODIUM 40 MG: 100 INJECTION SUBCUTANEOUS at 08:42

## 2022-09-06 RX ADMIN — GUAIFENESIN 600 MG: 600 TABLET ORAL at 08:42

## 2022-09-06 RX ADMIN — CEFTRIAXONE 1000 MG: 1 INJECTION, POWDER, FOR SOLUTION INTRAMUSCULAR; INTRAVENOUS at 21:11

## 2022-09-06 RX ADMIN — ATORVASTATIN CALCIUM 40 MG: 40 TABLET, FILM COATED ORAL at 21:10

## 2022-09-06 RX ADMIN — DOXYCYCLINE HYCLATE 100 MG: 100 CAPSULE ORAL at 11:47

## 2022-09-06 RX ADMIN — IPRATROPIUM BROMIDE AND ALBUTEROL SULFATE 1 AMPULE: .5; 3 SOLUTION RESPIRATORY (INHALATION) at 08:03

## 2022-09-06 RX ADMIN — IPRATROPIUM BROMIDE AND ALBUTEROL SULFATE 1 AMPULE: .5; 3 SOLUTION RESPIRATORY (INHALATION) at 15:56

## 2022-09-06 RX ADMIN — SODIUM CHLORIDE, PRESERVATIVE FREE 10 ML: 5 INJECTION INTRAVENOUS at 08:41

## 2022-09-06 NOTE — PROGRESS NOTES
OCCUPATIONAL THERAPY Initial Assessment and Daily Note       OT Visit Days: 1  Acknowledge Orders  Time  OT Charge Capture  Rehab Caseload Tracker      Vikki Mckeon is a 71 y.o. male   PRIMARY DIAGNOSIS: Community acquired pneumonia due to Pneumococcus ([facility] [address]  Shortness of breath [R06.02]  Community acquired pneumonia due to Pneumococcus ([facility] [address] Fabrizio Aas  COVID-19 virus infection [U07.1]       Reason for Referral: Generalized Muscle Weakness (M62.81)  Other lack of cordination (R27.8)  Difficulty in walking, Not elsewhere classified (R26.2)  Inpatient: Payor: [address] / Plan: [employer] / Product Type: *No Product type* /     ASSESSMENT:     REHAB RECOMMENDATIONS:   Recommendation to date pending progress:  Setting:  Short-term Rehab    Equipment:    To Be Determined     ASSESSMENT:  Mr. Fabby Gutierrez presented to the hospital with worsening SOB. Pt reported he tested positive for COVID 2 days PTA but tested negative per RVP. Pt does not wear O2 at baseline but is currently requiring 6L. At baseline, pt lives alone and is independent for his ADLs and IADLs. 10 steps to enter. Still drives. Today, pt was received supine in bed. Completed bed mobility, LB dressing, functional transfers, ambulation with HHA, and simple grooming task standing standing at the sink with overall CGA-Blade. Pt with very poor activity tolerance--unable to fully participate due to SOB and fatigue. Required encouragement to remain sitting in the chair. Educated on need for and importance of OOB activity--verbalized understanding but still hesitant to remain in the chair. Pt currently requiring hands on assistance for all ADLs and mobility--lives alone with limited social support. Recommend further rehab at this time. Vikki Mckeon currently demonstrates overall deficits in strength, balance, activity tolerance, and ADL performance.  Patient would benefit from skilled OT services at this time in order to address functional deficits and OT goals stated above. [address] [facility] [place] [address] [id number] [facility] 6 Clicks Daily Activity Inpatient Short Form:    AM-PAC Daily Activity Inpatient   How much help for putting on and taking off regular lower body clothing?: A Little  How much help for Bathing?: A Little  How much help for Toileting?: A Little  How much help for putting on and taking off regular upper body clothing?: A Little  How much help for taking care of personal grooming?: A Little  How much help for eating meals?: None  [facility] Inpatient Daily Activity Raw Score: 19  AM-PAC Inpatient ADL T-Scale Score : 40.22  ADL Inpatient CMS 0-100% Score: 42.8  ADL Inpatient CMS G-Code Modifier : CK    SUBJECTIVE:     Mr. Jasvir Melchor states, \"I want you to leave. \"     Social/Functional Lives With: Alone  Type of Home: House  Home Layout: Two level  Home Access: Stairs to enter with rails  Entrance Stairs - Number of Steps: 8-10  ADL Assistance: Independent  Homemaking Assistance: Independent  Ambulation Assistance: Independent  Transfer Assistance: Independent  Active : Yes    OBJECTIVE:     Yanelis Hwang / [place] Bath / ucilla Ball: NA    RESTRICTIONS/PRECAUTIONS:  Restrictions/Precautions: Fall Risk, Contact Precautions (COVID-19)    PAIN: Erla Nakia / O2:   Pre Treatment:   Pain Assessment: None - Denies Pain      Post Treatment: no change        Vitals          Oxygen   6L, O2 sats in the low 90s         GROSS EVALUATION: INTACT IMPAIRED   (See Comments)   UE AROM [x] []   UE PROM [x] []   Strength []  Generalized weakness BUEs     Posture / Balance []  Fair+ sitting, fair standing    Sensation [x]     Coordination []       Tone [x]       Edema [x]    Activity Tolerance []  Poor, seated rest breaks required     Hand Dominance R [] L []      COGNITION/  PERCEPTION: INTACT IMPAIRED   (See Comments)   Orientation [x]     Vision [x]     Hearing [x]     Cognition  []  Decreased insight into current functional deficits    Perception []       MOBILITY: I Mod I S SBA CGA Min Mod Max Total  NT x2 Comments:   Bed Mobility    Rolling [] [] [] [] [] [] [] [] [] [x] []    Supine to Sit [] [] [] [] [x] [] [] [] [] [] []    Scooting [] [] [] [] [x] [] [] [] [] [] []    Sit to Supine [] [] [] [] [] [] [] [] [] [x] [] Left sitting in the chair    Transfers    Sit to Stand [] [] [] [] [x] [] [] [] [] [] []    Bed to Chair [] [] [] [] [x] [x] [] [] [] [] []    Stand to Sit [] [] [] [] [x] [] [] [] [] [] []    Tub/Shower [] [] [] [] [] [] [] [] [] [x] []     Toilet [] [] [] [] [] [] [] [] [] [x] []      [] [] [] [] [] [] [] [] [] [] []    I=Independent, Mod I=Modified Independent, S=Supervision/Setup, SBA=Standby Assistance, CGA=Contact Guard Assistance, Min=Minimal Assistance, Mod=Moderate Assistance, Max=Maximal Assistance, Total=Total Assistance, NT=Not Tested    ACTIVITIES OF DAILY LIVING: I Mod I S SBA CGA Min Mod Max Total NT Comments   BASIC ADLs:              Upper Body Bathing  [] [] [] [] [] [] [] [] [] [x]    Lower Body Bathing [] [] [] [] [] [] [] [] [] [x]    Toileting [] [] [] [] [] [] [] [] [] [x]    Upper Body Dressing [] [] [] [] [] [] [] [] [] [x]    Lower Body Dressing [] [] [] [] [x] [] [] [] [] [] Socks    Feeding [] [] [] [] [] [] [] [] [] [x]    Grooming [] [] [] [] [x] [] [] [] [] [] Washed hands standing at the sink   Personal Device Care [] [] [] [] [] [] [] [] [] [x]    Functional Mobility [] [] [] [] [x] [x] [] [] [] []    I=Independent, Mod I=Modified Independent, S=Supervision/Setup, SBA=Standby Assistance, CGA=Contact Guard Assistance, Min=Minimal Assistance, Mod=Moderate Assistance, Max=Maximal Assistance, Total=Total Assistance, NT=Not Tested    PLAN:     FREQUENCY/DURATION   OT Plan of Care: 3 times/week for duration of hospital stay or until stated goals are met, whichever comes first.    ACUTE OCCUPATIONAL THERAPY GOALS:   (Developed with and agreed upon by patient and/or caregiver.)  1.  Patient will complete lower body bathing and dressing with MOD I and adaptive equipment as needed. 2.Patient will complete upper body bathing and dressing with MOD I and adaptive equipment as needed. 3. Patient will complete toileting with MOD I.   4. Patient will tolerate 30 minutes of OT treatment with 1-2 rest breaks to increase activity tolerance for ADLs. 5. Patient will complete functional transfers with MOD I and adaptive equipment as needed. 6. Patient will complete functional activity with MOD I and adaptive equipment as needed. Timeframe: 7 visits        PROBLEM LIST:   (Skilled intervention is medically necessary to address:)  Decreased ADL/Functional Activities  Decreased Activity Tolerance  Decreased Balance  Decreased Cognition  Decreased Coordination  Decreased Safety Awareness  Decreased Strength  Decreased Transfer Abilities   INTERVENTIONS PLANNED:  (Benefits and precautions of occupational therapy have been discussed with the patient.)  Self Care Training  Therapeutic Activity  Therapeutic Exercise/HEP  Neuromuscular Re-education  Manual Therapy  Education         TREATMENT:     EVALUATION: LOW COMPLEXITY: (Untimed Charge)    TREATMENT:   Co-Treatment PT/OT necessary due to patient's decreased overall endurance/tolerance levels, as well as need for high level skilled assistance to complete functional transfers/mobility and functional tasks  Neuromuscular Re-education (15 Minutes): Neuromuscular Re-education included Balance Training, Coordination training, Postural training, Sitting balance training, and Standing balance training to improve Balance, Coordination, Functional Mobility, and Postural Control. TREATMENT GRID:  N/A    AFTER TREATMENT PRECAUTIONS: Alarm Activated, Call light within reach, Chair, Needs within reach, and RN notified    INTERDISCIPLINARY COLLABORATION:  RN/ PCT, PT/ PTA, and OT/ MARKS    EDUCATION:  Education Given To: Patient  Education Provided: Role of Therapy;Plan of Care; Fall Prevention Strategies  Education Outcome: Verbalized understanding;Demonstrated understanding    TOTAL TREATMENT DURATION AND TIME:  Time In: 0911  Time Out: [address] [facility]  Minutes: [address] [facility], OT

## 2022-09-06 NOTE — PROGRESS NOTES
Hospitalist Progress Note   Admit Date:  9/3/2022  9:17 PM   Name:  Dayron Dyer   Age:  71 y.o. Sex:  male  :  1950   MRN:  [id number]   Room:  [facility]/    Presenting Complaint: Shortness of Breath    Reason(s) for Admission: Shortness of breath [R06.02]  Community acquired pneumonia due to Pneumococcus [facility]) Jamas Jointer  COVID-19 virus infection [U07.1]     Hospital Course & Interval History:   Dayron Dyer is a 71years old  male with past medical history of COPD on supplemental oxygen as needed basis, carotid disease, dyslipidemia, hypertension who presented to emergency room with chief complaint of worsening shortness of breath, cough, wheezing. Patient was desaturating to mid 80s, initiated on supplemental oxygen. Reports testing COVID positive 2 days PTA but negative per RVP    Subjective/24hr Events (22):  Endorses some increased SOB. States \"I don't feel well\" Denies CP, fever, chills     ROS:  10 systems reviewed and negative except as noted above. Assessment & Plan:   Acute hypoxic respiratory failure: I  22  -Likely 2/2 CAP/COPD exacerbation 2/2 continued tobacco use. Endorses 1 PPD habit   -supplemental oxygen at 4L with sats in mid 90s; wean as able   -patient was using oxygen as needed at home   -Smoking cessation advised   -RVP panel negative; Bcx NGTD  22  -Recs are for rehab  -CXR pending for increased SOB and O2 requirement   -Continuous Pulse Ox     Principal problem:  Community-acquired pneumonia:  22  -ceftriaxone, doxycycline, EOT 9/10 will monitor respiratory status.  -Pulmonary toilet  -Leukocytosis improving     Hypokalemia  22  -Replace  -BMP in a.m.; Mg WNL     Acute exacerbation of COPD:   22  -duonebs  -iv solumedrol  -O2 wean as able   -Pulmonary toilet   -PT/OT    Tobacco Use  -Nicotine patch      Dyslipidemia  -statin     Diet:  ADULT DIET; Regular; 3 carb choices (45 gm/meal);  Chop Meats - patient has no teeth  ADULT ORAL NUTRITION SUPPLEMENT; Breakfast, Lunch, Dinner; Standard High Calorie/High Protein Oral Supplement  DVT PPx: lovenox  Code status: FULL CODE        Objective:   Patient Vitals for the past 24 hrs:   Temp Pulse Resp BP SpO2   09/06/22 1139 -- -- -- (!) 146/74 --   09/06/22 1131 -- 63 18 -- 97 %   09/06/22 1120 98.1 °F (36.7 °C) 56 18 (!) 130/109 97 %   09/06/22 1018 -- -- -- -- 91 %   09/06/22 0804 -- 60 18 -- 95 %   09/06/22 0728 97.7 °F (36.5 °C) 56 18 (!) 143/77 90 %   09/06/22 0251 97.9 °F (36.6 °C) 59 16 122/81 96 %   09/05/22 2258 97.7 °F (36.5 °C) 73 16 122/81 97 %   09/05/22 2051 -- 94 18 -- --   09/05/22 1908 97.9 °F (36.6 °C) (!) 102 16 133/88 96 %   09/05/22 1730 -- (!) 113 18 -- (!) 81 %   09/05/22 1655 -- 95 18 -- 90 %   09/05/22 1514 97.9 °F (36.6 °C) 95 18 (!) 147/89 94 %       Estimated body mass index is 16.1 kg/m² as calculated from the following:    Height as of this encounter: 5' 10\" (1.778 m). Weight as of this encounter: 112 lb 3.4 oz (50.9 kg). Intake/Output Summary (Last 24 hours) at 9/6/2022 1156  Last data filed at 9/6/2022 0328  Gross per 24 hour   Intake 240 ml   Output 600 ml   Net -360 ml         Physical Exam:     Blood pressure (!) 146/74, pulse 63, temperature 98.1 °F (36.7 °C), temperature source Oral, resp. rate 18, height 5' 10\" (1.778 m), weight 112 lb 3.4 oz (50.9 kg), SpO2 97 %. General:    No overt distress  Head:  Normocephalic, atraumatic  Eyes:  Sclerae appear normal.  Pupils equally round. ENT:  Nares appear normal, no drainage. Moist oral mucosa  Neck:  No restricted ROM. Trachea midline   CV:   RRR. No m/r/g. No jugular venous distension. Lungs:   Scattered ronchi+/wheezing  Abdomen: Bowel sounds present. Soft, nontender, nondistended. Extremities: No cyanosis or clubbing. No edema  Skin:     No rashes and normal coloration. Warm and dry. Neuro:  CN II-XII grossly intact. A&Ox3  Psych:  Normal mood and affect.       I have reviewed ordered lab tests Value Ref Range    Magnesium 2.5 (H) 1.8 - 2.4 mg/dL       [unfilled]    Other Studies:  [unfilled]    Current Meds:  Current Facility-Administered Medications   Medication Dose Route Frequency    guaiFENesin (MUCINEX) extended release tablet 600 mg  600 mg Oral BID    ipratropium-albuterol (DUONEB) nebulizer solution 1 ampule  1 ampule Inhalation Q4H PRN    aspirin chewable tablet 81 mg  81 mg Oral Daily    mometasone-formoterol (DULERA) 200-5 MCG/ACT inhaler 2 puff  2 puff Inhalation BID    atorvastatin (LIPITOR) tablet 40 mg  40 mg Oral Nightly    ezetimibe (ZETIA) tablet 10 mg  10 mg Oral Nightly    atenolol (TENORMIN) tablet 50 mg  50 mg Oral Daily    sodium chloride flush 0.9 % injection 5-40 mL  5-40 mL IntraVENous 2 times per day    sodium chloride flush 0.9 % injection 5-40 mL  5-40 mL IntraVENous PRN    0.9 % sodium chloride infusion   IntraVENous PRN    enoxaparin (LOVENOX) injection 40 mg  40 mg SubCUTAneous Q24H    ondansetron (ZOFRAN-ODT) disintegrating tablet 4 mg  4 mg Oral Q8H PRN    Or    ondansetron (ZOFRAN) injection 4 mg  4 mg IntraVENous Q6H PRN    polyethylene glycol (GLYCOLAX) packet 17 g  17 g Oral Daily PRN    acetaminophen (TYLENOL) tablet 650 mg  650 mg Oral Q6H PRN    Or    acetaminophen (TYLENOL) suppository 650 mg  650 mg Rectal Q6H PRN    cefTRIAXone (ROCEPHIN) 1,000 mg in sodium chloride 0.9 % 50 mL IVPB mini-bag  1,000 mg IntraVENous Q24H    And    doxycycline hyclate (VIBRAMYCIN) capsule 100 mg  100 mg Oral 2 times per day    ipratropium-albuterol (DUONEB) nebulizer solution 1 ampule  1 ampule Inhalation Q4H WA    metoprolol (LOPRESSOR) injection 5 mg  5 mg IntraVENous Q6H PRN    methylPREDNISolone sodium (SOLU-MEDROL) injection 40 mg  40 mg IntraVENous Q8H       Signed:  Corina Elias, APRN - CNP    Part of this note may have been written by using a voice dictation software. The note has been proof read but may still contain some grammatical/other typographical errors.

## 2022-09-06 NOTE — PROGRESS NOTES
Patient awake resting in bed. Respirations present. On 3 L NC. No signs of distress. AXO x4. No needs expressed. Bed low and locked. Call light within reach. Preparing to give bedside report to oncoming RN.

## 2022-09-06 NOTE — PROGRESS NOTES
PHYSICAL THERAPY Initial Assessment, Daily Note, and AM  (Link to Caseload Tracking: PT Visit Days : 1  Acknowledge Orders  Time In/Out  PT Charge Capture  Rehab Caseload Tracker    Monalisa Clark is a 71 y.o. male   PRIMARY DIAGNOSIS: Community acquired pneumonia due to Pneumococcus ([facility] [address]  Shortness of breath [R06.02]  Community acquired pneumonia due to Pneumococcus ([facility] [address] Juancho Stuart  COVID-19 virus infection [U07.1]       Reason for Referral: Generalized Muscle Weakness (M62.81)  Other lack of cordination (R27.8)  Difficulty in walking, Not elsewhere classified (R26.2)  Inpatient: Payor: Nathalie Jose / Plan: Dhiraj Lawton / Product Type: *No Product type* /     ASSESSMENT:     REHAB RECOMMENDATIONS:   Recommendation to date pending progress:  Setting:  Short-term Rehab    Equipment:    To Be Determined     ASSESSMENT:  Mr. Cherry Lowe is a 71 y.o. male with hx of COPD admitted with COVID-19 PNA. Upon PT evaluation, pt exhibits BLE weakness, impaired balance, reduced respiratory reserve, and reduced activity tolerance resulting in limited independence with functional mobility. At baseline, pt is independent with all mobility. Pt is now requiring min A and is limited to 5' ambulation due to SOB. SpO2 stable at 91% throughout session on 6 lpm..      Pt will require STR at discharge. Pt will continue to benefit from skilled PT to address above impairments and maximize functional independence prior to discharge.   .     [facility] [address] [id number] AM-PAC 6 Clicks Basic Mobility Inpatient Short Form  AM-PAC Mobility Inpatient   How much difficulty turning over in bed?: A Little  How much difficulty sitting down on / standing up from a chair with arms?: A Little  How much difficulty moving from lying on back to sitting on side of bed?: A Little  How much help from another person moving to and from a bed to a chair?: A Little  How much help from another person needed to walk in hospital room?: A Lot  How much help [] [] [] [] [] [] [] [] [] [] []    I=Independent, Mod I=Modified Independent, S=Supervision, SBA=Standby Assistance, CGA=Contact Guard Assistance,   Min=Minimal Assistance, Mod=Moderate Assistance, Max=Maximal Assistance, Total=Total Assistance, NT=Not Tested    GAIT: I Mod I S SBA CGA Min Mod Max Total  NT x2 Comments:   Level of Assistance [] [] [] [] [] [x] [] [] [] [] []    Distance 2 x 5' feet    DME None    Gait Quality Decreased tori , Decreased step length, Narrow base of support, Path deviations , and Shuffling     Weightbearing Status Restrictions/Precautions  Restrictions/Precautions: Fall Risk, Contact Precautions (COVID-19)    Stairs      I=Independent, Mod I=Modified Independent, S=Supervision, SBA=Standby Assistance, CGA=Contact Guard Assistance,   Min=Minimal Assistance, Mod=Moderate Assistance, Max=Maximal Assistance, Total=Total Assistance, NT=Not Tested    PLAN:   ACUTE PHYSICAL THERAPY GOALS:   (Developed with and agreed upon by patient and/or caregiver.)  Pt will perform supine to/from sit with mod I in 7 treatment days. Pt will perform sit to/from stand with mod I and LRAD in 7 treatment days. Pt will ambulate 150 ft with mod I and LRAD in 7 treatment days. Pt will negotiate 10 stairs with mod I and LRAD in 7 treatment days. Pt will be independent with HEP in 7 days.       FREQUENCY AND DURATION: 3 times/week for duration of hospital stay or until stated goals are met, whichever comes first.    THERAPY PROGNOSIS: Good    PROBLEM LIST:   (Skilled intervention is medically necessary to address:)  Decreased Activity Tolerance  Decreased Balance  Decreased Gait Ability  Decreased Safety Awareness  Decreased Strength  Decreased Transfer Abilities INTERVENTIONS PLANNED:   (Benefits and precautions of physical therapy have been discussed with the patient.)  Self Care Training  Therapeutic Activity  Therapeutic Exercise/HEP  Neuromuscular Re-education  Education       TREATMENT:   EVALUATION: MODERATE COMPLEXITY: (Untimed Charge)    TREATMENT:   Co-Treatment PT/OT necessary due to patient's decreased overall endurance/tolerance levels, as well as need for high level skilled assistance to complete functional transfers/mobility and functional tasks  Therapeutic Activity (23 Minutes): Therapeutic activity included Rolling, Supine to Sit, Scooting, Lateral Scooting, Transfer Training, Ambulation on level ground, Sitting balance , Standing balance, and education to improve functional Activity tolerance, Balance, Mobility, Strength, and safety/discharge planning. Education: Educated pt regarding role of PT, PT POC, fall risk, safety, AD use, need for OOB mobility, consequences of bedrest, barriers to home discharge, role of STR. Therex: Ankle pumps, quad sets, and glut sets for DVT prophylaxis, to be performed hourly.     TREATMENT GRID:  N/A    AFTER TREATMENT PRECAUTIONS: Alarm Activated, Bed/Chair Locked, Call light within reach, Chair, Heels floated, Needs within reach, and RN notified    INTERDISCIPLINARY COLLABORATION:  RN/ PCT, PT/ PTA, and OT/ MARKS    EDUCATION: Education Given To: Patient  Education Provided: Role of Therapy;Plan of Care;Precautions;Transfer Training;Energy Conservation;Home Exercise Program;Fall Prevention Strategies  Education Method: Demonstration;Verbal  Barriers to Learning: None  Education Outcome: Verbalized understanding;Demonstrated understanding    TIME IN/OUT:  Time In: 0910  Time Out: [id number]  Minutes: [address] PT

## 2022-09-06 NOTE — CARE COORDINATION
09/06/22 0932   Service Assessment   Patient Orientation Alert and Oriented   Cognition Alert   History Provided By Patient   Primary [address]   PCP Verified by CM Yes   Prior Functional Level Independent in ADLs/IADLs   Current Functional Level Independent in ADLs/IADLs   Can patient return to prior living arrangement Unknown at present   Ability to make needs known: Good   Family able to assist with home care needs: No   Social/Functional History   Lives With Alone   Type of [address] Two level   Home Access Stairs to enter with rails   Entrance Stairs - Number of Steps 8-10   ADL Assistance Independent   Homemaking Assistance Independent   Ambulation Assistance Independent   Transfer Assistance Independent   Active  Yes   Patient lives alone. Confirmed patient has a pcp. No history of HH or rehab. DME: none  Lives in a home with 8-10 steps. Has a daughter in [place]. Drives. CM following for discharge needs.

## 2022-09-06 NOTE — PROGRESS NOTES
Patient awake resting in bed. Respirations present. On 6 L HF NC. No signs of distress. AxO x4. No needs expressed. Bed low and locked. Call light within reach. Will continue to monitor.

## 2022-09-07 ENCOUNTER — TELEPHONE (OUTPATIENT)
Dept: FAMILY MEDICINE CLINIC | Facility: CLINIC | Age: 72
End: 2022-09-07

## 2022-09-07 LAB
ANION GAP SERPL CALC-SCNC: 2 MMOL/L (ref 4–13)
BUN SERPL-MCNC: 39 MG/DL (ref 8–23)
CALCIUM SERPL-MCNC: 9.1 MG/DL (ref 8.3–10.4)
CHLORIDE SERPL-SCNC: 103 MMOL/L (ref 101–110)
CO2 SERPL-SCNC: 33 MMOL/L (ref 21–32)
CREAT SERPL-MCNC: 0.9 MG/DL (ref 0.8–1.5)
ERYTHROCYTE [DISTWIDTH] IN BLOOD BY AUTOMATED COUNT: 14.1 % (ref 11.9–14.6)
GLUCOSE SERPL-MCNC: 118 MG/DL (ref 65–100)
HCT VFR BLD AUTO: 46.6 % (ref 41.1–50.3)
HGB BLD-MCNC: 15.1 G/DL (ref 13.6–17.2)
MCH RBC QN AUTO: 29.2 PG (ref 26.1–32.9)
MCHC RBC AUTO-ENTMCNC: 32.4 G/DL (ref 31.4–35)
MCV RBC AUTO: 90.1 FL (ref 79.6–97.8)
NRBC # BLD: 0 K/UL (ref 0–0.2)
PLATELET # BLD AUTO: 157 K/UL (ref 150–450)
PMV BLD AUTO: 9.9 FL (ref 9.4–12.3)
POTASSIUM SERPL-SCNC: 4.1 MMOL/L (ref 3.5–5.1)
RBC # BLD AUTO: 5.17 M/UL (ref 4.23–5.6)
SODIUM SERPL-SCNC: 138 MMOL/L (ref 138–145)
WBC # BLD AUTO: 8.1 K/UL (ref 4.3–11.1)

## 2022-09-07 PROCEDURE — 36415 COLL VENOUS BLD VENIPUNCTURE: CPT

## 2022-09-07 PROCEDURE — 6360000002 HC RX W HCPCS: Performed by: INTERNAL MEDICINE

## 2022-09-07 PROCEDURE — 2580000003 HC RX 258: Performed by: HOSPITALIST

## 2022-09-07 PROCEDURE — 6360000002 HC RX W HCPCS: Performed by: NURSE PRACTITIONER

## 2022-09-07 PROCEDURE — 6360000002 HC RX W HCPCS: Performed by: HOSPITALIST

## 2022-09-07 PROCEDURE — 94640 AIRWAY INHALATION TREATMENT: CPT

## 2022-09-07 PROCEDURE — 6370000000 HC RX 637 (ALT 250 FOR IP): Performed by: FAMILY MEDICINE

## 2022-09-07 PROCEDURE — 85027 COMPLETE CBC AUTOMATED: CPT

## 2022-09-07 PROCEDURE — 94761 N-INVAS EAR/PLS OXIMETRY MLT: CPT

## 2022-09-07 PROCEDURE — 80048 BASIC METABOLIC PNL TOTAL CA: CPT

## 2022-09-07 PROCEDURE — 2700000000 HC OXYGEN THERAPY PER DAY

## 2022-09-07 PROCEDURE — 6370000000 HC RX 637 (ALT 250 FOR IP): Performed by: HOSPITALIST

## 2022-09-07 PROCEDURE — 1100000000 HC RM PRIVATE

## 2022-09-07 PROCEDURE — 6370000000 HC RX 637 (ALT 250 FOR IP): Performed by: NURSE PRACTITIONER

## 2022-09-07 PROCEDURE — 97530 THERAPEUTIC ACTIVITIES: CPT

## 2022-09-07 RX ORDER — METHYLPREDNISOLONE SODIUM SUCCINATE 40 MG/ML
40 INJECTION, POWDER, LYOPHILIZED, FOR SOLUTION INTRAMUSCULAR; INTRAVENOUS EVERY 12 HOURS
Status: DISCONTINUED | OUTPATIENT
Start: 2022-09-07 | End: 2022-09-09

## 2022-09-07 RX ORDER — ATENOLOL 25 MG/1
12.5 TABLET ORAL DAILY
Status: DISCONTINUED | OUTPATIENT
Start: 2022-09-07 | End: 2022-09-12 | Stop reason: HOSPADM

## 2022-09-07 RX ADMIN — MOMETASONE FUROATE AND FORMOTEROL FUMARATE DIHYDRATE 2 PUFF: 200; 5 AEROSOL RESPIRATORY (INHALATION) at 20:32

## 2022-09-07 RX ADMIN — GUAIFENESIN 600 MG: 600 TABLET ORAL at 21:20

## 2022-09-07 RX ADMIN — DOXYCYCLINE HYCLATE 100 MG: 100 CAPSULE ORAL at 23:39

## 2022-09-07 RX ADMIN — METHYLPREDNISOLONE SODIUM SUCCINATE 40 MG: 40 INJECTION, POWDER, FOR SOLUTION INTRAMUSCULAR; INTRAVENOUS at 12:51

## 2022-09-07 RX ADMIN — ATORVASTATIN CALCIUM 40 MG: 40 TABLET, FILM COATED ORAL at 21:19

## 2022-09-07 RX ADMIN — EZETIMIBE 10 MG: 10 TABLET ORAL at 21:19

## 2022-09-07 RX ADMIN — METHYLPREDNISOLONE SODIUM SUCCINATE 40 MG: 40 INJECTION, POWDER, FOR SOLUTION INTRAMUSCULAR; INTRAVENOUS at 23:39

## 2022-09-07 RX ADMIN — METHYLPREDNISOLONE SODIUM SUCCINATE 40 MG: 40 INJECTION, POWDER, FOR SOLUTION INTRAMUSCULAR; INTRAVENOUS at 00:27

## 2022-09-07 RX ADMIN — GUAIFENESIN 600 MG: 600 TABLET ORAL at 08:22

## 2022-09-07 RX ADMIN — ASPIRIN 81 MG: 81 TABLET, CHEWABLE ORAL at 08:22

## 2022-09-07 RX ADMIN — IPRATROPIUM BROMIDE AND ALBUTEROL SULFATE 1 AMPULE: .5; 3 SOLUTION RESPIRATORY (INHALATION) at 20:32

## 2022-09-07 RX ADMIN — ENOXAPARIN SODIUM 40 MG: 100 INJECTION SUBCUTANEOUS at 08:22

## 2022-09-07 RX ADMIN — SODIUM CHLORIDE, PRESERVATIVE FREE 5 ML: 5 INJECTION INTRAVENOUS at 21:19

## 2022-09-07 RX ADMIN — SODIUM CHLORIDE, PRESERVATIVE FREE 10 ML: 5 INJECTION INTRAVENOUS at 08:23

## 2022-09-07 RX ADMIN — DOXYCYCLINE HYCLATE 100 MG: 100 CAPSULE ORAL at 12:51

## 2022-09-07 RX ADMIN — DOXYCYCLINE HYCLATE 100 MG: 100 CAPSULE ORAL at 00:27

## 2022-09-07 RX ADMIN — CEFTRIAXONE 1000 MG: 1 INJECTION, POWDER, FOR SOLUTION INTRAMUSCULAR; INTRAVENOUS at 21:21

## 2022-09-07 ASSESSMENT — PAIN SCALES - GENERAL: PAINLEVEL_OUTOF10: 0

## 2022-09-07 NOTE — TELEPHONE ENCOUNTER
Spoke with pt's daughter, Rossana Duckworth. Advised that she call office to schedule hospital follow up after D/C from hospital.  She is unsure of when he will be D/C'd.

## 2022-09-07 NOTE — PROGRESS NOTES
Pt resting with no complaints of pain or discomfort at this time. Pt is alert and oriented times 4. Pt remains on 3L NC with even an unlabored respirations.

## 2022-09-07 NOTE — PLAN OF CARE
Problem: Discharge Planning  Goal: Discharge to home or other facility with appropriate resources  Outcome: Progressing     Problem: Safety - Adult  Goal: Free from fall injury  Outcome: Progressing  Flowsheets  Taken 9/7/2022 0428 by Namrata Aleman RN  Free From Fall Injury: Instruct family/caregiver on patient safety  Taken 9/6/2022 2302 by Namrata Aleman RN  Free From Fall Injury: Instruct family/caregiver on patient safety

## 2022-09-07 NOTE — TELEPHONE ENCOUNTER
Per patient is in the Hospital due to pneumonia. . unable to really talk much.  Called child \" Emily\" [place] schedule a f/u

## 2022-09-07 NOTE — PROGRESS NOTES
PHYSICAL THERAPY: Daily Note PM   (Link to Caseload Tracking: PT Visit Days : 1  Time In/Out PT Charge Capture  Rehab Caseload Tracker  Orders    Juani Ho is a 71 y.o. male   PRIMARY DIAGNOSIS: Community acquired pneumonia due to Pneumococcus ([facility] [address]  Shortness of breath [R06.02]  Community acquired pneumonia due to Pneumococcus ([facility] [address] Amy Edmonds  COVID-19 virus infection [U07.1]       Inpatient: Payor: Henry Santos / Plan: Beverly Lopez / Product Type: *No Product type* /     ASSESSMENT:     REHAB RECOMMENDATIONS:   Recommendation to date pending progress:  Setting:  Short-term Rehab    Equipment:    To Be Determined     ASSESSMENT:  Mr. Kirsten Santana presents in supine. Significant extra time is spent w/ maximal encouragement to get patient to participate and allow this therapist to assist him to safely during transfers. His activity tolerance is noted to be very poor. He requires min Ax2 to transfer from bed to chair w/ RW, followed by transfer from chair to bedside commode. Following toileting, he requires min Ax2 to transfer back to bed. Progress today was limited by minimal partcipation by pt.        SUBJECTIVE:   Mr. Kirsten Santana states, \"Get me the toilet \"     Social/Functional Lives With: Alone  Type of Home: House  Home Layout: Two level  Home Access: Stairs to enter with rails  Entrance Stairs - Number of Steps: 8-10  ADL Assistance: Independent  Homemaking Assistance: Independent  Ambulation Assistance: Independent  Transfer Assistance: Independent  Active : Yes  OBJECTIVE:     PAIN: Arvil Octave / O2: Jim Taylor / Jean-Claude Ryan / Mellissa Grand:   Pre Treatment:   Pain Assessment: 0-10  Pain Level: 0      Post Treatment: 0 Vitals        Oxygen    IV    RESTRICTIONS/PRECAUTIONS:  Restrictions/Precautions  Restrictions/Precautions: Fall Risk, Contact Precautions (COVID-19)  Restrictions/Precautions: Fall Risk, Contact Precautions (COVID-19)     MOBILITY: I Mod I S SBA CGA Min Mod Max Total  NT x2 Comments:   Bed Mobility    Rolling [] [] [] [] [] [x] [] [] [] [] [x]    Supine to Sit [] [] [] [] [] [x] [] [] [] [] [x]    Scooting [] [] [] [] [] [x] [] [] [] [] [x]    Sit to Supine [] [] [] [] [] [x] [] [] [] [] [x]    Transfers    Sit to Stand [] [] [] [] [] [x] [] [] [] [] [x]    Bed to Chair [] [] [] [] [] [x] [] [] [] [] [x]    Stand to Sit [] [] [] [] [] [x] [] [] [] [] [x]     [] [] [] [] [] [] [] [] [] [] []    I=Independent, Mod I=Modified Independent, S=Supervision, SBA=Standby Assistance, CGA=Contact Guard Assistance,   Min=Minimal Assistance, Mod=Moderate Assistance, Max=Maximal Assistance, Total=Total Assistance, NT=Not Tested    BALANCE: Good Fair+ Fair Fair- Poor NT Comments   Sitting Static [] [x] [] [] [] []    Sitting Dynamic [] [] [] [] [] []              Standing Static [] [] [x] [] [] []    Standing Dynamic [] [] [x] [] [] []      GAIT: I Mod I S SBA CGA Min Mod Max Total  NT x2 Comments:   Level of Assistance [] [] [] [] [] [x] [] [] [] [] []    Distance 2 x 6 feet    DME Gait Belt and Rolling Walker    Gait Quality Decreased tori , Decreased step clearance, Decreased step length, and Decreased stance    Weightbearing Status      Stairs      I=Independent, Mod I=Modified Independent, S=Supervision, SBA=Standby Assistance, CGA=Contact Guard Assistance,   Min=Minimal Assistance, Mod=Moderate Assistance, Max=Maximal Assistance, Total=Total Assistance, NT=Not Tested    PLAN:   ACUTE PHYSICAL THERAPY GOALS:   (Developed with and agreed upon by patient and/or caregiver.)  Pt will perform supine to/from sit with mod I in 7 treatment days. Pt will perform sit to/from stand with mod I and LRAD in 7 treatment days. Pt will ambulate 150 ft with mod I and LRAD in 7 treatment days. Pt will negotiate 10 stairs with mod I and LRAD in 7 treatment days. Pt will be independent with HEP in 7 days.     FREQUENCY AND DURATION: 3 times/week for duration of hospital stay or until stated goals are met, whichever comes first.    TREATMENT:   TREATMENT:   Therapeutic Activity (30 Minutes): Therapeutic activity included Rolling, Supine to Sit, Sit to Supine, Scooting, Transfer Training, Ambulation on level ground, Sitting balance , and Standing balance to improve functional Activity tolerance, Balance, Coordination, Mobility, Strength, and ROM.     TREATMENT GRID:  N/A    AFTER TREATMENT PRECAUTIONS: Alarm Activated, Bed, Bed/Chair Locked, and Needs within reach    INTERDISCIPLINARY COLLABORATION:  RN/ PCT and PT/ PTA    EDUCATION:      TIME IN/OUT:  Time In: 1426  Time Out: [facility]  Minutes: [id number] Christopher Cardozo, PT

## 2022-09-07 NOTE — PROGRESS NOTES
Hospitalist Progress Note   Admit Date:  9/3/2022  9:17 PM   Name:  Holly Slade   Age:  71 y.o. Sex:  male  :  1950   MRN:  [id number]   Room:  0/    Presenting Complaint: Shortness of Breath     Reason(s) for Admission: Shortness of breath [R06.02]  Community acquired pneumonia due to Pneumococcus [facility]) Flory Cristina  COVID-19 virus infection [U07.1]     Hospital Course:   71years old  male with past medical history of COPD on supplemental oxygen as needed basis, carotid disease, dyslipidemia, hypertension admitted  acute respiratory failure with hypoxia dx with COVID 19 . Subjective & 24hr Events (22): A&O x3, sitting on bedside commode. Afebrile. Sats mid 90s now on 5 liters. Assessment & Plan:     Acute hypoxic respiratory failure: I  22  -Likely 2/2 CAP/COPD exacerbation  continued tobacco use. Endorses 1 PPD habit   -supplemental oxygen at 4L with sats in mid 90s; wean as able   -patient was using oxygen as needed at home   -Smoking cessation advised   -RVP panel negative; Bcx NGTD    CXR  hyperinflated but clear  Decrease Solumedrol to bid  Continue Duoneb  -ceftriaxone, doxycycline, EOT 9/10   Continue aggressive pulm toileting. Principal problem:  Community-acquired pneumonia:  22  -ceftriaxone, doxycycline, EOT 9/10 will monitor respiratory status.  -Pulmonary toilet    Debility:  PT/OT recs SNF  CM assisting. Hypokalemia    resolved     Tobacco Use  -Nicotine patch      Dyslipidemia  -statin    Diet:  ADULT DIET; Regular; 3 carb choices (45 gm/meal);  Chop Meats - patient has no teeth  ADULT ORAL NUTRITION SUPPLEMENT; Breakfast, Lunch, Dinner; Standard High Calorie/High Protein Oral Supplement  DVT PPx: Lovenox SQ  Code status: Full Code    Hospital Problems:  Principal Problem:    Community acquired pneumonia due to Pneumococcus [facility])  Active Problems:    Acute respiratory failure with hypoxia ([address]    Pneumonia due to COVID-19 virus    Severe protein-calorie malnutrition (HCC)    Mixed hyperlipidemia    COPD with acute exacerbation ([address]  Resolved Problems:    * No resolved hospital problems. *      Objective:   Patient Vitals for the past 24 hrs:   Temp Pulse Resp BP SpO2   09/07/22 1101 97.7 °F (36.5 °C) 59 18 (!) 154/89 94 %   09/07/22 0813 -- 55 16 -- 92 %   09/07/22 0729 97.7 °F (36.5 °C) 55 18 (!) 159/80 93 %   09/07/22 0306 97.9 °F (36.6 °C) 51 16 (!) 147/76 90 %   09/06/22 2328 98.1 °F (36.7 °C) 58 16 133/80 93 %   09/06/22 1947 97.5 °F (36.4 °C) 73 16 127/83 91 %   09/06/22 1556 -- 63 17 -- 96 %   09/06/22 1455 98.1 °F (36.7 °C) 59 18 138/78 94 %       Oxygen Therapy  SpO2: 94 %  Pulse Oximeter Device Mode: Continuous  Pulse Oximeter Device Location: Right, Finger  O2 Device: Nasal cannula  O2 Flow Rate (L/min): 3 L/min    Estimated body mass index is 16.1 kg/m² as calculated from the following:    Height as of this encounter: 5' 10\" (1.778 m). Weight as of this encounter: 112 lb 3.4 oz (50.9 kg). Intake/Output Summary (Last 24 hours) at 9/7/2022 1317  Last data filed at 9/7/2022 0701  Gross per 24 hour   Intake --   Output 590 ml   Net -590 ml         Physical Exam:   Blood pressure (!) 154/89, pulse 59, temperature 97.7 °F (36.5 °C), temperature source Oral, resp. rate 18, height 5' 10\" (1.778 m), weight 112 lb 3.4 oz (50.9 kg), SpO2 94 %. General:          No overt distress  Head:               Normocephalic, atraumatic  Eyes:               Sclerae appear normal.  Pupils equally round. ENT:                Nares appear normal, no drainage. Moist oral mucosa  Neck:               No restricted ROM. Trachea midline   CV:                  RRR. No m/r/g. No jugular venous distension. Lungs:             diminished lower lobes bilat, equal chest expansion. Abdomen: Bowel sounds present. Soft, nontender, nondistended. Extremities:     No cyanosis or clubbing.   No edema  Skin:                No rashes and normal coloration. Warm and dry. Neuro:             CN II-XII grossly intact. A&Ox3  Psych:             Normal mood and affect. I have personally reviewed labs and tests showing:  Recent Labs:  Recent Results (from the past 48 hour(s))   Basic Metabolic Panel    Collection Time: 09/07/22  4:08 AM   Result Value Ref Range    Sodium 138 138 - 145 mmol/L    Potassium 4.1 3.5 - 5.1 mmol/L    Chloride 103 101 - 110 mmol/L    CO2 33 (H) 21 - 32 mmol/L    Anion Gap 2 (L) 4 - 13 mmol/L    Glucose 118 (H) 65 - 100 mg/dL    BUN 39 (H) 8 - 23 MG/DL    Creatinine 0.90 0.8 - 1.5 MG/DL    GFR African American >60 >60 ml/min/1.73m2    GFR Non- >60 >60 ml/min/1.73m2    Calcium 9.1 8.3 - 10.4 MG/DL   CBC    Collection Time: 09/07/22  4:08 AM   Result Value Ref Range    WBC 8.1 4.3 - 11.1 K/uL    RBC 5.17 4.23 - 5.6 M/uL    Hemoglobin 15.1 13.6 - 17.2 g/dL    Hematocrit 46.6 41.1 - 50.3 %    MCV 90.1 79.6 - 97.8 FL    MCH 29.2 26.1 - 32.9 PG    MCHC 32.4 31.4 - 35.0 g/dL    RDW 14.1 11.9 - 14.6 %    Platelets 128 298 - 418 K/uL    MPV 9.9 9.4 - 12.3 FL    nRBC 0.00 0.0 - 0.2 K/uL       I have personally reviewed imaging studies showing: Other Studies:  XR CHEST PORTABLE   Final Result   Lungs appear hyperinflated but are otherwise clear. No new   infiltrate or lung consolidation. Basilar airspace opacities on prior exam have   cleared. Apical lung scarring on the right is unchanged. The heart is normal in   size. No pneumothorax. No pleural effusions. Lower cervical fusion plate is   noted. XR CHEST PORTABLE   Final Result      1. Bibasilar interstitial densities, nonspecific but may be secondary to   aspiration, atelectasis or edema. 2. Emphysema.              Current Meds:  Current Facility-Administered Medications   Medication Dose Route Frequency    methylPREDNISolone sodium (SOLU-MEDROL) injection 40 mg  40 mg IntraVENous Q12H    atenolol (TENORMIN) tablet 12.5 mg  12.5 mg Oral Daily guaiFENesin (MUCINEX) extended release tablet 600 mg  600 mg Oral BID    ipratropium-albuterol (DUONEB) nebulizer solution 1 ampule  1 ampule Inhalation Q4H PRN    aspirin chewable tablet 81 mg  81 mg Oral Daily    mometasone-formoterol (DULERA) 200-5 MCG/ACT inhaler 2 puff  2 puff Inhalation BID    atorvastatin (LIPITOR) tablet 40 mg  40 mg Oral Nightly    ezetimibe (ZETIA) tablet 10 mg  10 mg Oral Nightly    sodium chloride flush 0.9 % injection 5-40 mL  5-40 mL IntraVENous 2 times per day    sodium chloride flush 0.9 % injection 5-40 mL  5-40 mL IntraVENous PRN    0.9 % sodium chloride infusion   IntraVENous PRN    enoxaparin (LOVENOX) injection 40 mg  40 mg SubCUTAneous Q24H    ondansetron (ZOFRAN-ODT) disintegrating tablet 4 mg  4 mg Oral Q8H PRN    Or    ondansetron (ZOFRAN) injection 4 mg  4 mg IntraVENous Q6H PRN    polyethylene glycol (GLYCOLAX) packet 17 g  17 g Oral Daily PRN    acetaminophen (TYLENOL) tablet 650 mg  650 mg Oral Q6H PRN    Or    acetaminophen (TYLENOL) suppository 650 mg  650 mg Rectal Q6H PRN    cefTRIAXone (ROCEPHIN) 1,000 mg in sodium chloride 0.9 % 50 mL IVPB mini-bag  1,000 mg IntraVENous Q24H    And    doxycycline hyclate (VIBRAMYCIN) capsule 100 mg  100 mg Oral 2 times per day    ipratropium-albuterol (DUONEB) nebulizer solution 1 ampule  1 ampule Inhalation Q4H WA    metoprolol (LOPRESSOR) injection 5 mg  5 mg IntraVENous Q6H PRN       Signed:  Travis Wolff, APRN - CNP

## 2022-09-07 NOTE — PROGRESS NOTES
Pt is resting in bed with no complaints of pain or discomfort at this time. Pt is alert and oriented times 4. Pt is on 3L NC with even and unlabored respirations. Pt has continuous 02 monitor in place. Will continue to monitor.

## 2022-09-07 NOTE — PROGRESS NOTES
Patient found with o2 sat 85 % on 3 L NC. This RN increased patient to 5 L NC. O2 sat increased to 92 %. This RN notified Cora Quiroz NP, of the above.

## 2022-09-07 NOTE — PROGRESS NOTES
Patient awake resting in bed. Respirations present. On 3 L NC. No signs of distress. AxO x4. No needs expressed. Bed low and locked. Call light within reach. Will continue to monitor.

## 2022-09-08 LAB
ANION GAP SERPL CALC-SCNC: 3 MMOL/L (ref 4–13)
BACTERIA SPEC CULT: NORMAL
BACTERIA SPEC CULT: NORMAL
BASOPHILS # BLD: 0 K/UL (ref 0–0.2)
BASOPHILS NFR BLD: 0 % (ref 0–2)
BUN SERPL-MCNC: 38 MG/DL (ref 8–23)
CALCIUM SERPL-MCNC: 9.2 MG/DL (ref 8.3–10.4)
CHLORIDE SERPL-SCNC: 102 MMOL/L (ref 101–110)
CO2 SERPL-SCNC: 36 MMOL/L (ref 21–32)
CREAT SERPL-MCNC: 0.9 MG/DL (ref 0.8–1.5)
DIFFERENTIAL METHOD BLD: ABNORMAL
EOSINOPHIL # BLD: 0 K/UL (ref 0–0.8)
EOSINOPHIL NFR BLD: 0 % (ref 0.5–7.8)
ERYTHROCYTE [DISTWIDTH] IN BLOOD BY AUTOMATED COUNT: 13.9 % (ref 11.9–14.6)
GLUCOSE SERPL-MCNC: 130 MG/DL (ref 65–100)
HCT VFR BLD AUTO: 47.2 % (ref 41.1–50.3)
HGB BLD-MCNC: 15.5 G/DL (ref 13.6–17.2)
IMM GRANULOCYTES # BLD AUTO: 0.1 K/UL (ref 0–0.5)
IMM GRANULOCYTES NFR BLD AUTO: 1 % (ref 0–5)
LYMPHOCYTES # BLD: 0.3 K/UL (ref 0.5–4.6)
LYMPHOCYTES NFR BLD: 4 % (ref 13–44)
MCH RBC QN AUTO: 29 PG (ref 26.1–32.9)
MCHC RBC AUTO-ENTMCNC: 32.8 G/DL (ref 31.4–35)
MCV RBC AUTO: 88.4 FL (ref 79.6–97.8)
MONOCYTES # BLD: 0.4 K/UL (ref 0.1–1.3)
MONOCYTES NFR BLD: 6 % (ref 4–12)
NEUTS SEG # BLD: 6.7 K/UL (ref 1.7–8.2)
NEUTS SEG NFR BLD: 89 % (ref 43–78)
NRBC # BLD: 0 K/UL (ref 0–0.2)
PLATELET # BLD AUTO: 164 K/UL (ref 150–450)
PMV BLD AUTO: 10.1 FL (ref 9.4–12.3)
POTASSIUM SERPL-SCNC: 4.2 MMOL/L (ref 3.5–5.1)
RBC # BLD AUTO: 5.34 M/UL (ref 4.23–5.6)
SERVICE CMNT-IMP: NORMAL
SERVICE CMNT-IMP: NORMAL
SODIUM SERPL-SCNC: 141 MMOL/L (ref 138–145)
WBC # BLD AUTO: 7.5 K/UL (ref 4.3–11.1)

## 2022-09-08 PROCEDURE — 85025 COMPLETE CBC W/AUTO DIFF WBC: CPT

## 2022-09-08 PROCEDURE — 80048 BASIC METABOLIC PNL TOTAL CA: CPT

## 2022-09-08 PROCEDURE — 6370000000 HC RX 637 (ALT 250 FOR IP): Performed by: NURSE PRACTITIONER

## 2022-09-08 PROCEDURE — 94761 N-INVAS EAR/PLS OXIMETRY MLT: CPT

## 2022-09-08 PROCEDURE — 6360000002 HC RX W HCPCS: Performed by: HOSPITALIST

## 2022-09-08 PROCEDURE — 6360000002 HC RX W HCPCS: Performed by: NURSE PRACTITIONER

## 2022-09-08 PROCEDURE — 2700000000 HC OXYGEN THERAPY PER DAY

## 2022-09-08 PROCEDURE — 1100000000 HC RM PRIVATE

## 2022-09-08 PROCEDURE — 36415 COLL VENOUS BLD VENIPUNCTURE: CPT

## 2022-09-08 PROCEDURE — 6370000000 HC RX 637 (ALT 250 FOR IP): Performed by: HOSPITALIST

## 2022-09-08 PROCEDURE — 2580000003 HC RX 258: Performed by: HOSPITALIST

## 2022-09-08 PROCEDURE — 94640 AIRWAY INHALATION TREATMENT: CPT

## 2022-09-08 PROCEDURE — 6370000000 HC RX 637 (ALT 250 FOR IP): Performed by: FAMILY MEDICINE

## 2022-09-08 RX ORDER — IPRATROPIUM BROMIDE AND ALBUTEROL SULFATE 2.5; .5 MG/3ML; MG/3ML
1 SOLUTION RESPIRATORY (INHALATION) 4 TIMES DAILY
Status: DISCONTINUED | OUTPATIENT
Start: 2022-09-08 | End: 2022-09-12 | Stop reason: HOSPADM

## 2022-09-08 RX ADMIN — GUAIFENESIN 600 MG: 600 TABLET ORAL at 08:49

## 2022-09-08 RX ADMIN — SODIUM CHLORIDE, PRESERVATIVE FREE 10 ML: 5 INJECTION INTRAVENOUS at 21:07

## 2022-09-08 RX ADMIN — MOMETASONE FUROATE AND FORMOTEROL FUMARATE DIHYDRATE 2 PUFF: 200; 5 AEROSOL RESPIRATORY (INHALATION) at 07:26

## 2022-09-08 RX ADMIN — METHYLPREDNISOLONE SODIUM SUCCINATE 40 MG: 40 INJECTION, POWDER, FOR SOLUTION INTRAMUSCULAR; INTRAVENOUS at 11:19

## 2022-09-08 RX ADMIN — IPRATROPIUM BROMIDE AND ALBUTEROL SULFATE 1 AMPULE: .5; 3 SOLUTION RESPIRATORY (INHALATION) at 15:23

## 2022-09-08 RX ADMIN — SODIUM CHLORIDE, PRESERVATIVE FREE 10 ML: 5 INJECTION INTRAVENOUS at 08:49

## 2022-09-08 RX ADMIN — ASPIRIN 81 MG: 81 TABLET, CHEWABLE ORAL at 08:49

## 2022-09-08 RX ADMIN — GUAIFENESIN 600 MG: 600 TABLET ORAL at 21:06

## 2022-09-08 RX ADMIN — ATENOLOL 12.5 MG: 25 TABLET ORAL at 08:49

## 2022-09-08 RX ADMIN — IPRATROPIUM BROMIDE AND ALBUTEROL SULFATE 1 AMPULE: .5; 3 SOLUTION RESPIRATORY (INHALATION) at 20:21

## 2022-09-08 RX ADMIN — MOMETASONE FUROATE AND FORMOTEROL FUMARATE DIHYDRATE 2 PUFF: 200; 5 AEROSOL RESPIRATORY (INHALATION) at 20:22

## 2022-09-08 RX ADMIN — CEFTRIAXONE 1000 MG: 1 INJECTION, POWDER, FOR SOLUTION INTRAMUSCULAR; INTRAVENOUS at 21:41

## 2022-09-08 RX ADMIN — EZETIMIBE 10 MG: 10 TABLET ORAL at 21:07

## 2022-09-08 RX ADMIN — ATORVASTATIN CALCIUM 40 MG: 40 TABLET, FILM COATED ORAL at 21:06

## 2022-09-08 RX ADMIN — ENOXAPARIN SODIUM 40 MG: 100 INJECTION SUBCUTANEOUS at 08:49

## 2022-09-08 RX ADMIN — DOXYCYCLINE HYCLATE 100 MG: 100 CAPSULE ORAL at 11:19

## 2022-09-08 RX ADMIN — IPRATROPIUM BROMIDE AND ALBUTEROL SULFATE 1 AMPULE: .5; 3 SOLUTION RESPIRATORY (INHALATION) at 11:09

## 2022-09-08 ASSESSMENT — PAIN SCALES - GENERAL: PAINLEVEL_OUTOF10: 0

## 2022-09-08 NOTE — PROGRESS NOTES
Pt resting in bed comfortably at this time, alert and oriented times 4. No distress noted, respirations even and unlabored on 5 L NC, continuous pulse ox noted. Pt instructed to call for assistance if needed, call light in place, will continue to monitor.

## 2022-09-08 NOTE — PROGRESS NOTES
Pt is sitting in bed with no complaints of pain or discomfort at this time. Pt is alert and oriented times 4. Pt remains on 5L NC with even and unlabored respirations. Call light is in place. Will continue to monitor.

## 2022-09-08 NOTE — PROGRESS NOTES
Discharge- Mimalei Iniguezvijaya 1995, 22 y o  female MRN: [id number]    Pt resting in bed comfortably at this time, alert and oriented times 4. No distress noted, respirations even and unlabored on 3 L NC continuous pulse ox noted in room. Pt instructed to call for assistance if needed, call light in place, will continue to monitor. Will prepare for bedside shift report. 

## 2022-09-08 NOTE — PROGRESS NOTES
Pt is resting with no complaints of pain or discomfort at this time. Pt is alert and oriented times 4. Pt remains on 5L NC with even and unlabored respirations.

## 2022-09-08 NOTE — CARE COORDINATION
CM met with the patient to discuss snf options. Patient would like a bed at [facility]. Awaiting response.

## 2022-09-08 NOTE — PROGRESS NOTES
Hospitalist Progress Note   Admit Date:  9/3/2022  9:17 PM   Name:  Kerry Crespo   Age:  71 y.o. Sex:  male  :  1950   MRN:  [id number]   Room:  [facility]/    Presenting Complaint: Shortness of Breath     Reason(s) for Admission: Shortness of breath [R06.02]  Community acquired pneumonia due to Pneumococcus [facility]) Tennie Mock  COVID-19 virus infection [U07.1]     Hospital Course:   Mr. Hailee Bob is a 71years old CM with a PMH of COPD on supplemental oxygen as needed basis, carotid disease, dyslipidemia, hypertension who was admitted  with acute respiratory failure with hypoxia. The patient was started on antibiotics and steroid. He was placed on supplemental oxygen. He is being weaned. Respiratory viral panel was negative on Sep/04. Blood cultures finalized negative. He will complete the antibiotic course on Sep/10. PT/OT recommend STR placement. Subjective & 24hr Events (22): No AEO. The patient in sitting in bed on 3 L NC in no distress. Will take Duo Neb from q4hr to q6hr. Further wean steroid tomorrow morning. Referral has been sent to [facility] facility. Assessment & Plan:     Acute hypoxic respiratory failure 2/2 CAP/COPD exacerbation 2/2 continued tobacco use  -supplemental oxygen at 3L with sats in mid 90s; wean as able   -patient was using oxygen as needed at home   -RVP panel negative; Bcx finalized negative  CXR  hyperinflated but clear  Continue Duo Neb  Decrease Solumedrol to daily on Sep/09     Community-acquired pneumonia  - Respiratory viral panel negative on Sep/04  - ceftriaxone, doxycycline, EOT 9/10   - Pulmonary toilet     Debility  PT/OT recs STR  CM assisting. Hypokalemia  - Resolved     Tobacco Use  - Endorses 1 PPD habit   -Nicotine patch   -Smoking cessation advised       Anticipated discharge needs: STR    Diet:  ADULT DIET; Regular; 3 carb choices (45 gm/meal);  Chop Meats - patient has no teeth  ADULT ORAL NUTRITION SUPPLEMENT; Breakfast, Lunch, Dinner; Standard High Calorie/High Protein Oral Supplement  DVT PPx: enoxaparin  Code status: Full Code    Hospital Problems:  Principal Problem:    Community acquired pneumonia due to Pneumococcus [facility])  Active Problems:    Acute respiratory failure with hypoxia ([facility] [address]    Pneumonia due to COVID-19 virus    Severe protein-calorie malnutrition ([facility] [address]    Mixed hyperlipidemia    COPD with acute exacerbation ([facility]  Resolved Problems:    * No resolved hospital problems. *      Objective:   Patient Vitals for the past 24 hrs:   Temp Pulse Resp BP SpO2   09/08/22 1110 -- 59 16 -- 95 %   09/08/22 0836 97.8 °F (36.6 °C) 65 18 (!) 162/81 93 %   09/08/22 0726 -- 60 16 -- 97 %   09/08/22 0330 97.5 °F (36.4 °C) 57 16 (!) 152/81 93 %   09/07/22 2334 97.7 °F (36.5 °C) 77 16 137/79 98 %   09/07/22 2032 -- 66 18 -- 96 %   09/07/22 2003 97.7 °F (36.5 °C) 73 16 (!) 170/85 95 %   09/07/22 1501 98.2 °F (36.8 °C) 83 18 (!) 140/96 91 %       Oxygen Therapy  SpO2: 95 %  Pulse Oximeter Device Mode: Continuous  Pulse Oximeter Device Location: Right, Finger  O2 Device: Nasal cannula  O2 Flow Rate (L/min): 2 L/min    Estimated body mass index is 16.1 kg/m² as calculated from the following:    Height as of this encounter: 5' 10\" (1.778 m). Weight as of this encounter: 112 lb 3.4 oz (50.9 kg). Intake/Output Summary (Last 24 hours) at 9/8/2022 1336  Last data filed at 9/8/2022 0635  Gross per 24 hour   Intake 240 ml   Output 370 ml   Net -130 ml         Physical Exam:   Blood pressure (!) 162/81, pulse 59, temperature 97.8 °F (36.6 °C), temperature source Oral, resp. rate 16, height 5' 10\" (1.778 m), weight 112 lb 3.4 oz (50.9 kg), SpO2 95 %. General:    NAD    Head:  Normocephalic, atraumatic  Eyes:  Sclerae appear normal.  Pupils equally round. ENT:  Nares appear normal, no drainage. Moist oral mucosa  Neck:  No restricted ROM. Trachea midline   CV:   RRR. No m/r/g. Lungs: No wheezing. Symmetric expansion on 3 L NC.   Abdomen:   Soft, nontender, nondistended. Extremities: No cyanosis or clubbing. No edema  Skin:     No rashes and normal coloration. Warm and dry. Neuro:  CN II-XII grossly intact. A&Ox3  Psych:  Normal mood and affect.       I have personally reviewed labs and tests showing:  Recent Labs:  Recent Results (from the past 48 hour(s))   Basic Metabolic Panel    Collection Time: 09/07/22  4:08 AM   Result Value Ref Range    Sodium 138 138 - 145 mmol/L    Potassium 4.1 3.5 - 5.1 mmol/L    Chloride 103 101 - 110 mmol/L    CO2 33 (H) 21 - 32 mmol/L    Anion Gap 2 (L) 4 - 13 mmol/L    Glucose 118 (H) 65 - 100 mg/dL    BUN 39 (H) 8 - 23 MG/DL    Creatinine 0.90 0.8 - 1.5 MG/DL    GFR African American >60 >60 ml/min/1.73m2    GFR Non- >60 >60 ml/min/1.73m2    Calcium 9.1 8.3 - 10.4 MG/DL   CBC    Collection Time: 09/07/22  4:08 AM   Result Value Ref Range    WBC 8.1 4.3 - 11.1 K/uL    RBC 5.17 4.23 - 5.6 M/uL    Hemoglobin 15.1 13.6 - 17.2 g/dL    Hematocrit 46.6 41.1 - 50.3 %    MCV 90.1 79.6 - 97.8 FL    MCH 29.2 26.1 - 32.9 PG    MCHC 32.4 31.4 - 35.0 g/dL    RDW 14.1 11.9 - 14.6 %    Platelets 746 346 - 494 K/uL    MPV 9.9 9.4 - 12.3 FL    nRBC 0.00 0.0 - 0.2 K/uL   CBC with Auto Differential    Collection Time: 09/08/22  3:42 AM   Result Value Ref Range    WBC 7.5 4.3 - 11.1 K/uL    RBC 5.34 4.23 - 5.6 M/uL    Hemoglobin 15.5 13.6 - 17.2 g/dL    Hematocrit 47.2 41.1 - 50.3 %    MCV 88.4 79.6 - 97.8 FL    MCH 29.0 26.1 - 32.9 PG    MCHC 32.8 31.4 - 35.0 g/dL    RDW 13.9 11.9 - 14.6 %    Platelets 581 132 - 496 K/uL    MPV 10.1 9.4 - 12.3 FL    nRBC 0.00 0.0 - 0.2 K/uL    Differential Type AUTOMATED      Seg Neutrophils 89 (H) 43 - 78 %    Lymphocytes 4 (L) 13 - 44 %    Monocytes 6 4.0 - 12.0 %    Eosinophils % 0 (L) 0.5 - 7.8 %    Basophils 0 0.0 - 2.0 %    Immature Granulocytes 1 0.0 - 5.0 %    Segs Absolute 6.7 1.7 - 8.2 K/UL    Absolute Lymph # 0.3 (L) 0.5 - 4.6 K/UL    Absolute Mono # 0.4 0.1 - 1.3 K/UL Absolute Eos # 0.0 0.0 - 0.8 K/UL    Basophils Absolute 0.0 0.0 - 0.2 K/UL    Absolute Immature Granulocyte 0.1 0.0 - 0.5 K/UL   Basic Metabolic Panel w/ Reflex to MG    Collection Time: 09/08/22  3:42 AM   Result Value Ref Range    Sodium 141 138 - 145 mmol/L    Potassium 4.2 3.5 - 5.1 mmol/L    Chloride 102 101 - 110 mmol/L    CO2 36 (H) 21 - 32 mmol/L    Anion Gap 3 (L) 4 - 13 mmol/L    Glucose 130 (H) 65 - 100 mg/dL    BUN 38 (H) 8 - 23 MG/DL    Creatinine 0.90 0.8 - 1.5 MG/DL    GFR African American >60 >60 ml/min/1.73m2    GFR Non- >60 >60 ml/min/1.73m2    Calcium 9.2 8.3 - 10.4 MG/DL       I have personally reviewed imaging studies showing: Other Studies:  XR CHEST PORTABLE   Final Result   Lungs appear hyperinflated but are otherwise clear. No new   infiltrate or lung consolidation. Basilar airspace opacities on prior exam have   cleared. Apical lung scarring on the right is unchanged. The heart is normal in   size. No pneumothorax. No pleural effusions. Lower cervical fusion plate is   noted. XR CHEST PORTABLE   Final Result      1. Bibasilar interstitial densities, nonspecific but may be secondary to   aspiration, atelectasis or edema. 2. Emphysema.              Current Meds:  Current Facility-Administered Medications   Medication Dose Route Frequency    methylPREDNISolone sodium (SOLU-MEDROL) injection 40 mg  40 mg IntraVENous Q12H    atenolol (TENORMIN) tablet 12.5 mg  12.5 mg Oral Daily    guaiFENesin (MUCINEX) extended release tablet 600 mg  600 mg Oral BID    ipratropium-albuterol (DUONEB) nebulizer solution 1 ampule  1 ampule Inhalation Q4H PRN    aspirin chewable tablet 81 mg  81 mg Oral Daily    mometasone-formoterol (DULERA) 200-5 MCG/ACT inhaler 2 puff  2 puff Inhalation BID    atorvastatin (LIPITOR) tablet 40 mg  40 mg Oral Nightly    ezetimibe (ZETIA) tablet 10 mg  10 mg Oral Nightly    sodium chloride flush 0.9 % injection 5-40 mL  5-40 mL IntraVENous 2 times per day    sodium chloride flush 0.9 % injection 5-40 mL  5-40 mL IntraVENous PRN    0.9 % sodium chloride infusion   IntraVENous PRN    enoxaparin (LOVENOX) injection 40 mg  40 mg SubCUTAneous Q24H    ondansetron (ZOFRAN-ODT) disintegrating tablet 4 mg  4 mg Oral Q8H PRN    Or    ondansetron (ZOFRAN) injection 4 mg  4 mg IntraVENous Q6H PRN    polyethylene glycol (GLYCOLAX) packet 17 g  17 g Oral Daily PRN    acetaminophen (TYLENOL) tablet 650 mg  650 mg Oral Q6H PRN    Or    acetaminophen (TYLENOL) suppository 650 mg  650 mg Rectal Q6H PRN    cefTRIAXone (ROCEPHIN) 1,000 mg in sodium chloride 0.9 % 50 mL IVPB mini-bag  1,000 mg IntraVENous Q24H    And    doxycycline hyclate (VIBRAMYCIN) capsule 100 mg  100 mg Oral 2 times per day    ipratropium-albuterol (DUONEB) nebulizer solution 1 ampule  1 ampule Inhalation Q4H WA    metoprolol (LOPRESSOR) injection 5 mg  5 mg IntraVENous Q6H PRN       Signed:  VICKI Norman - CNP    Part of this note may have been written by using a voice dictation software. The note has been proof read but may still contain some grammatical/other typographical errors.

## 2022-09-09 PROCEDURE — 6370000000 HC RX 637 (ALT 250 FOR IP): Performed by: NURSE PRACTITIONER

## 2022-09-09 PROCEDURE — 6370000000 HC RX 637 (ALT 250 FOR IP): Performed by: FAMILY MEDICINE

## 2022-09-09 PROCEDURE — 6360000002 HC RX W HCPCS: Performed by: HOSPITALIST

## 2022-09-09 PROCEDURE — 2580000003 HC RX 258: Performed by: HOSPITALIST

## 2022-09-09 PROCEDURE — 6370000000 HC RX 637 (ALT 250 FOR IP): Performed by: HOSPITALIST

## 2022-09-09 PROCEDURE — 6360000002 HC RX W HCPCS: Performed by: NURSE PRACTITIONER

## 2022-09-09 PROCEDURE — 2700000000 HC OXYGEN THERAPY PER DAY

## 2022-09-09 PROCEDURE — 1100000000 HC RM PRIVATE

## 2022-09-09 PROCEDURE — 94640 AIRWAY INHALATION TREATMENT: CPT

## 2022-09-09 RX ORDER — METHYLPREDNISOLONE SODIUM SUCCINATE 40 MG/ML
40 INJECTION, POWDER, LYOPHILIZED, FOR SOLUTION INTRAMUSCULAR; INTRAVENOUS DAILY
Status: DISCONTINUED | OUTPATIENT
Start: 2022-09-10 | End: 2022-09-11

## 2022-09-09 RX ADMIN — GUAIFENESIN 600 MG: 600 TABLET ORAL at 21:23

## 2022-09-09 RX ADMIN — SODIUM CHLORIDE, PRESERVATIVE FREE 10 ML: 5 INJECTION INTRAVENOUS at 21:25

## 2022-09-09 RX ADMIN — IPRATROPIUM BROMIDE AND ALBUTEROL SULFATE 1 AMPULE: .5; 3 SOLUTION RESPIRATORY (INHALATION) at 16:22

## 2022-09-09 RX ADMIN — MOMETASONE FUROATE AND FORMOTEROL FUMARATE DIHYDRATE 2 PUFF: 200; 5 AEROSOL RESPIRATORY (INHALATION) at 08:27

## 2022-09-09 RX ADMIN — SODIUM CHLORIDE, PRESERVATIVE FREE 10 ML: 5 INJECTION INTRAVENOUS at 08:25

## 2022-09-09 RX ADMIN — METHYLPREDNISOLONE SODIUM SUCCINATE 40 MG: 40 INJECTION, POWDER, FOR SOLUTION INTRAMUSCULAR; INTRAVENOUS at 00:22

## 2022-09-09 RX ADMIN — DOXYCYCLINE HYCLATE 100 MG: 100 CAPSULE ORAL at 12:38

## 2022-09-09 RX ADMIN — GUAIFENESIN 600 MG: 600 TABLET ORAL at 08:24

## 2022-09-09 RX ADMIN — CEFTRIAXONE 1000 MG: 1 INJECTION, POWDER, FOR SOLUTION INTRAMUSCULAR; INTRAVENOUS at 21:23

## 2022-09-09 RX ADMIN — IPRATROPIUM BROMIDE AND ALBUTEROL SULFATE 1 AMPULE: .5; 3 SOLUTION RESPIRATORY (INHALATION) at 12:25

## 2022-09-09 RX ADMIN — DOXYCYCLINE HYCLATE 100 MG: 100 CAPSULE ORAL at 00:22

## 2022-09-09 RX ADMIN — ASPIRIN 81 MG: 81 TABLET, CHEWABLE ORAL at 08:24

## 2022-09-09 RX ADMIN — ATENOLOL 12.5 MG: 25 TABLET ORAL at 08:24

## 2022-09-09 RX ADMIN — IPRATROPIUM BROMIDE AND ALBUTEROL SULFATE 1 AMPULE: .5; 3 SOLUTION RESPIRATORY (INHALATION) at 08:26

## 2022-09-09 RX ADMIN — EZETIMIBE 10 MG: 10 TABLET ORAL at 21:23

## 2022-09-09 RX ADMIN — IPRATROPIUM BROMIDE AND ALBUTEROL SULFATE 1 AMPULE: .5; 3 SOLUTION RESPIRATORY (INHALATION) at 20:19

## 2022-09-09 RX ADMIN — ENOXAPARIN SODIUM 40 MG: 100 INJECTION SUBCUTANEOUS at 08:24

## 2022-09-09 RX ADMIN — DOXYCYCLINE HYCLATE 100 MG: 100 CAPSULE ORAL at 23:47

## 2022-09-09 RX ADMIN — ATORVASTATIN CALCIUM 40 MG: 40 TABLET, FILM COATED ORAL at 21:23

## 2022-09-09 RX ADMIN — MOMETASONE FUROATE AND FORMOTEROL FUMARATE DIHYDRATE 2 PUFF: 200; 5 AEROSOL RESPIRATORY (INHALATION) at 20:18

## 2022-09-09 ASSESSMENT — PAIN SCALES - GENERAL: PAINLEVEL_OUTOF10: 0

## 2022-09-09 NOTE — PROGRESS NOTES
Hospitalist Progress Note   Admit Date:  9/3/2022  9:17 PM   Name:  Holly Slade   Age:  71 y.o. Sex:  male  :  1950   MRN:  [id number]   Room:  820/    Presenting Complaint: Shortness of Breath     Reason(s) for Admission: Shortness of breath [R06.02]  Community acquired pneumonia due to Pneumococcus [facility]) Flory Cristina  COVID-19 virus infection [U07.1]     Hospital Course:   Mr. Freya Ramey is a 71years old CM with a PMH of COPD on supplemental oxygen as needed basis, carotid disease, dyslipidemia, hypertension who was admitted  with acute respiratory failure with hypoxia. The patient was started on antibiotics and steroid. He was placed on supplemental oxygen. He is being weaned. Respiratory viral panel was negative on Sep/04. Blood cultures finalized negative. He will complete the antibiotic course on Sep/10. PT/OT recommend STR placement. Subjective & 24hr Events (22): No AEO. The patient denies any new complaint this morning. O2 qualifier performed today. Awaiting insurance for discharge to STR. Assessment & Plan:     Acute hypoxic respiratory failure 2/2 CAP/COPD exacerbation 2/2 continued tobacco use  -supplemental oxygen; wean as able   -patient was using oxygen as needed at home   -RVP panel negative; Bcx finalized negative  CXR  hyperinflated but clear  Decrease Duo Neb to q6hr  Decrease Solumedrol to daily on Sep/09  6-minute walk test on Sep/09 with RT shows patient will need supplemental O2 at discharge     Community-acquired pneumonia  - Respiratory viral panel negative on Sep/04  - ceftriaxone, doxycycline, EOT 9/10   - Pulmonary toilet     Debility  PT/OT recs STR  CM assisting     Hypokalemia  - Resolved     Tobacco Use  - Endorses 1 PPD habit   - Nicotine patch   - Smoking cessation advised       Anticipated discharge needs: STR, supplemental O2    Diet:  ADULT DIET; Regular; 3 carb choices (45 gm/meal);  Chop Meats - patient has no teeth  ADULT ORAL NUTRITION SUPPLEMENT; Breakfast, Lunch, Dinner; Standard High Calorie/High Protein Oral Supplement  DVT PPx: enoxaparin  Code status: Full Code    Hospital Problems:  Principal Problem:    Community acquired pneumonia due to Pneumococcus [facility])  Active Problems:    Acute respiratory failure with hypoxia ([facility] [address]    Pneumonia due to COVID-19 virus    Severe protein-calorie malnutrition ([facility] [address]    Mixed hyperlipidemia    COPD with acute exacerbation ([facility]  Resolved Problems:    * No resolved hospital problems. *      Objective:   Patient Vitals for the past 24 hrs:   Temp Pulse Resp BP SpO2   09/09/22 1102 97.5 °F (36.4 °C) 64 20 115/71 95 %   09/09/22 0735 97.9 °F (36.6 °C) 64 18 (!) 140/84 94 %   09/09/22 0434 97.8 °F (36.6 °C) 75 16 135/86 93 %   09/08/22 2320 98.4 °F (36.9 °C) 64 17 136/75 95 %   09/08/22 2022 -- 94 20 -- 94 %   09/08/22 1943 97.6 °F (36.4 °C) 75 20 122/73 92 %   09/08/22 1640 97.8 °F (36.6 °C) 65 20 120/79 99 %   09/08/22 1523 -- 60 16 -- 93 %   09/08/22 1354 97.7 °F (36.5 °C) 85 18 126/83 94 %         Oxygen Therapy  SpO2: 95 %  Pulse Oximeter Device Mode: Continuous  Pulse Oximeter Device Location: Right, Finger  O2 Device: Nasal cannula  FiO2 : 28 %  O2 Flow Rate (L/min): 2 L/min    Estimated body mass index is 16.1 kg/m² as calculated from the following:    Height as of this encounter: 5' 10\" (1.778 m). Weight as of this encounter: 112 lb 3.4 oz (50.9 kg). Intake/Output Summary (Last 24 hours) at 9/9/2022 1239  Last data filed at 9/9/2022 1102  Gross per 24 hour   Intake 480 ml   Output 275 ml   Net 205 ml           Physical Exam:   Blood pressure 115/71, pulse 64, temperature 97.5 °F (36.4 °C), temperature source Oral, resp. rate 20, height 5' 10\" (1.778 m), weight 112 lb 3.4 oz (50.9 kg), SpO2 95 %. General:    NAD    Head:  Normocephalic, atraumatic  Eyes:  Sclerae appear normal.  Pupils equally round. ENT:  Nares appear normal, no drainage. Moist oral mucosa  Neck:  No restricted ROM. Trachea midline   CV:   RRR. No m/r/g. Lungs: No wheezing. Symmetric expansion on 3 L NC. Abdomen:   Soft, nontender, nondistended. Extremities: No cyanosis or clubbing. No edema  Skin:     No rashes and normal coloration. Warm and dry. Neuro:  CN II-XII grossly intact. A&Ox3  Psych:  Normal mood and affect. I have personally reviewed labs and tests showing:  Recent Labs:  Recent Results (from the past 48 hour(s))   CBC with Auto Differential    Collection Time: 09/08/22  3:42 AM   Result Value Ref Range    WBC 7.5 4.3 - 11.1 K/uL    RBC 5.34 4.23 - 5.6 M/uL    Hemoglobin 15.5 13.6 - 17.2 g/dL    Hematocrit 47.2 41.1 - 50.3 %    MCV 88.4 79.6 - 97.8 FL    MCH 29.0 26.1 - 32.9 PG    MCHC 32.8 31.4 - 35.0 g/dL    RDW 13.9 11.9 - 14.6 %    Platelets 133 125 - 479 K/uL    MPV 10.1 9.4 - 12.3 FL    nRBC 0.00 0.0 - 0.2 K/uL    Differential Type AUTOMATED      Seg Neutrophils 89 (H) 43 - 78 %    Lymphocytes 4 (L) 13 - 44 %    Monocytes 6 4.0 - 12.0 %    Eosinophils % 0 (L) 0.5 - 7.8 %    Basophils 0 0.0 - 2.0 %    Immature Granulocytes 1 0.0 - 5.0 %    Segs Absolute 6.7 1.7 - 8.2 K/UL    Absolute Lymph # 0.3 (L) 0.5 - 4.6 K/UL    Absolute Mono # 0.4 0.1 - 1.3 K/UL    Absolute Eos # 0.0 0.0 - 0.8 K/UL    Basophils Absolute 0.0 0.0 - 0.2 K/UL    Absolute Immature Granulocyte 0.1 0.0 - 0.5 K/UL   Basic Metabolic Panel w/ Reflex to MG    Collection Time: 09/08/22  3:42 AM   Result Value Ref Range    Sodium 141 138 - 145 mmol/L    Potassium 4.2 3.5 - 5.1 mmol/L    Chloride 102 101 - 110 mmol/L    CO2 36 (H) 21 - 32 mmol/L    Anion Gap 3 (L) 4 - 13 mmol/L    Glucose 130 (H) 65 - 100 mg/dL    BUN 38 (H) 8 - 23 MG/DL    Creatinine 0.90 0.8 - 1.5 MG/DL    GFR African American >60 >60 ml/min/1.73m2    GFR Non- >60 >60 ml/min/1.73m2    Calcium 9.2 8.3 - 10.4 MG/DL       I have personally reviewed imaging studies showing:   Other Studies:  XR CHEST PORTABLE   Final Result   Lungs appear hyperinflated but are otherwise clear. No new   infiltrate or lung consolidation. Basilar airspace opacities on prior exam have   cleared. Apical lung scarring on the right is unchanged. The heart is normal in   size. No pneumothorax. No pleural effusions. Lower cervical fusion plate is   noted. XR CHEST PORTABLE   Final Result      1. Bibasilar interstitial densities, nonspecific but may be secondary to   aspiration, atelectasis or edema. 2. Emphysema.              Current Meds:  Current Facility-Administered Medications   Medication Dose Route Frequency    [START ON 9/10/2022] methylPREDNISolone sodium (SOLU-MEDROL) injection 40 mg  40 mg IntraVENous Daily    ipratropium-albuterol (DUONEB) nebulizer solution 1 ampule  1 ampule Inhalation 4x daily    atenolol (TENORMIN) tablet 12.5 mg  12.5 mg Oral Daily    guaiFENesin (MUCINEX) extended release tablet 600 mg  600 mg Oral BID    ipratropium-albuterol (DUONEB) nebulizer solution 1 ampule  1 ampule Inhalation Q4H PRN    aspirin chewable tablet 81 mg  81 mg Oral Daily    mometasone-formoterol (DULERA) 200-5 MCG/ACT inhaler 2 puff  2 puff Inhalation BID    atorvastatin (LIPITOR) tablet 40 mg  40 mg Oral Nightly    ezetimibe (ZETIA) tablet 10 mg  10 mg Oral Nightly    sodium chloride flush 0.9 % injection 5-40 mL  5-40 mL IntraVENous 2 times per day    sodium chloride flush 0.9 % injection 5-40 mL  5-40 mL IntraVENous PRN    0.9 % sodium chloride infusion   IntraVENous PRN    enoxaparin (LOVENOX) injection 40 mg  40 mg SubCUTAneous Q24H    ondansetron (ZOFRAN-ODT) disintegrating tablet 4 mg  4 mg Oral Q8H PRN    Or    ondansetron (ZOFRAN) injection 4 mg  4 mg IntraVENous Q6H PRN    polyethylene glycol (GLYCOLAX) packet 17 g  17 g Oral Daily PRN    acetaminophen (TYLENOL) tablet 650 mg  650 mg Oral Q6H PRN    Or    acetaminophen (TYLENOL) suppository 650 mg  650 mg Rectal Q6H PRN    cefTRIAXone (ROCEPHIN) 1,000 mg in sodium chloride 0.9 % 50 mL IVPB mini-bag  1,000 mg IntraVENous Q24H    And    doxycycline hyclate (VIBRAMYCIN) capsule 100 mg  100 mg Oral 2 times per day    metoprolol (LOPRESSOR) injection 5 mg  5 mg IntraVENous Q6H PRN       Signed:  VICKI Gaona CNP    Part of this note may have been written by using a voice dictation software. The note has been proof read but may still contain some grammatical/other typographical errors.

## 2022-09-09 NOTE — PROGRESS NOTES
Patient is resting in bed comfortably. Alert and oriented times 4. Respirations even and unlabored on 2 L NC. No distress noted at this time. Call light within reach. Received report from RN. Will continue to monitor.

## 2022-09-09 NOTE — PROGRESS NOTES
Occupational Therapy Note:    Attempted to see pt for OT treatment session--pt adamantly refused to participate in any OOB activity despite max encouragement and education on importance of OOB activity. Will continue to follow and attempt to see as schedule allows.     Thank you,  Navjot Leahy, OTR/L

## 2022-09-09 NOTE — PROGRESS NOTES
Physical Therapy Attempt Note    Pt refusing PT services due to \"not feeling like moving right now. \"  Educated pt regarding consequences of bedrest, benefits of OOB mobility, and role of PT, but pt continued to refuse. Will attempt again as able.      Ayaz Cordova, PT

## 2022-09-09 NOTE — PROGRESS NOTES
Patient  is sleeping comfortably in bed. Respirations even and unlabored on 2 L NC. Continuous pulse oximetry in place. Patient instructed to call for assistance. Call light within reach. Will prepare to give report to RN.

## 2022-09-09 NOTE — PROGRESS NOTES
09/09/22 1041   Resting (Room Air)   SpO2 93   HR 70   Resting (On O2)   SpO2 93   HR 89   O2 Device Nasal cannula   O2 Flow Rate (l/min) 2 l/min   FIO2 (%) 28   During Walk (Room Air)   SpO2 82   HR 84   Rate of Dyspnea 0   During Walk (On O2)   SpO2 84   HR 84   O2 Device Nasal cannula   O2 Flow Rate (l/min) 1 l/min   Need Additional O2 Flow Rate Rows Yes   O2 Flow Rate (l/min) 2 l/min  (pt had to stop and sit after only several minutes of walking. O2 up to 3 lpm to increase sats)   O2 Saturation 86   O2 Flow Rate (l/min) 3 l/min   O2 Saturation 88   O2 Flow Rate (l/min) 4 l/min   O2 Saturation 91   Rate of Dyspnea 1   Symptoms Leg pain; Shortness of breath   After Walk   SpO2 91   HR 92   O2 Flow Rate (l/min) 4 l/min   FIO2 (%) 36   Rate of Dyspnea 1   Symptoms Fatigue;Leg pain   Does the Patient Qualify for Home O2 Yes   Liter Flow at Rest 2   Liter Flow on Exertion 4   Does the Patient Need Portable Oxygen Tanks Yes

## 2022-09-10 PROCEDURE — 94761 N-INVAS EAR/PLS OXIMETRY MLT: CPT

## 2022-09-10 PROCEDURE — 6370000000 HC RX 637 (ALT 250 FOR IP): Performed by: NURSE PRACTITIONER

## 2022-09-10 PROCEDURE — 6370000000 HC RX 637 (ALT 250 FOR IP): Performed by: HOSPITALIST

## 2022-09-10 PROCEDURE — 2700000000 HC OXYGEN THERAPY PER DAY

## 2022-09-10 PROCEDURE — 6360000002 HC RX W HCPCS: Performed by: HOSPITALIST

## 2022-09-10 PROCEDURE — 6370000000 HC RX 637 (ALT 250 FOR IP): Performed by: FAMILY MEDICINE

## 2022-09-10 PROCEDURE — 2580000003 HC RX 258: Performed by: HOSPITALIST

## 2022-09-10 PROCEDURE — 1100000000 HC RM PRIVATE

## 2022-09-10 PROCEDURE — 94640 AIRWAY INHALATION TREATMENT: CPT

## 2022-09-10 PROCEDURE — 6360000002 HC RX W HCPCS: Performed by: NURSE PRACTITIONER

## 2022-09-10 RX ADMIN — GUAIFENESIN 600 MG: 600 TABLET ORAL at 21:26

## 2022-09-10 RX ADMIN — SODIUM CHLORIDE, PRESERVATIVE FREE 10 ML: 5 INJECTION INTRAVENOUS at 10:34

## 2022-09-10 RX ADMIN — ATENOLOL 12.5 MG: 25 TABLET ORAL at 10:32

## 2022-09-10 RX ADMIN — IPRATROPIUM BROMIDE AND ALBUTEROL SULFATE 1 AMPULE: .5; 3 SOLUTION RESPIRATORY (INHALATION) at 08:01

## 2022-09-10 RX ADMIN — EZETIMIBE 10 MG: 10 TABLET ORAL at 21:26

## 2022-09-10 RX ADMIN — GUAIFENESIN 600 MG: 600 TABLET ORAL at 10:32

## 2022-09-10 RX ADMIN — DOXYCYCLINE HYCLATE 100 MG: 100 CAPSULE ORAL at 12:45

## 2022-09-10 RX ADMIN — IPRATROPIUM BROMIDE AND ALBUTEROL SULFATE 1 AMPULE: .5; 3 SOLUTION RESPIRATORY (INHALATION) at 16:27

## 2022-09-10 RX ADMIN — METHYLPREDNISOLONE SODIUM SUCCINATE 40 MG: 40 INJECTION, POWDER, FOR SOLUTION INTRAMUSCULAR; INTRAVENOUS at 10:34

## 2022-09-10 RX ADMIN — ENOXAPARIN SODIUM 40 MG: 100 INJECTION SUBCUTANEOUS at 10:32

## 2022-09-10 RX ADMIN — MOMETASONE FUROATE AND FORMOTEROL FUMARATE DIHYDRATE 2 PUFF: 200; 5 AEROSOL RESPIRATORY (INHALATION) at 08:01

## 2022-09-10 RX ADMIN — CEFTRIAXONE 1000 MG: 1 INJECTION, POWDER, FOR SOLUTION INTRAMUSCULAR; INTRAVENOUS at 21:26

## 2022-09-10 RX ADMIN — IPRATROPIUM BROMIDE AND ALBUTEROL SULFATE 1 AMPULE: .5; 3 SOLUTION RESPIRATORY (INHALATION) at 20:16

## 2022-09-10 RX ADMIN — MOMETASONE FUROATE AND FORMOTEROL FUMARATE DIHYDRATE 2 PUFF: 200; 5 AEROSOL RESPIRATORY (INHALATION) at 20:16

## 2022-09-10 RX ADMIN — SODIUM CHLORIDE, PRESERVATIVE FREE 10 ML: 5 INJECTION INTRAVENOUS at 21:26

## 2022-09-10 RX ADMIN — ASPIRIN 81 MG: 81 TABLET, CHEWABLE ORAL at 10:34

## 2022-09-10 RX ADMIN — ATORVASTATIN CALCIUM 40 MG: 40 TABLET, FILM COATED ORAL at 21:26

## 2022-09-10 RX ADMIN — IPRATROPIUM BROMIDE AND ALBUTEROL SULFATE 1 AMPULE: .5; 3 SOLUTION RESPIRATORY (INHALATION) at 11:40

## 2022-09-10 NOTE — PROGRESS NOTES
Patient is resting in bed. Alert and oriented times 4. Respirations even and unlabored on 4 L NC. Continuous pulse oximetry in place. Patient denies pain at this time. No distress noted. No needs expressed. Patient instructed to call for assistance. Call light within reach. Received report from RN. Will continue to monitor.

## 2022-09-10 NOTE — PROGRESS NOTES
Oral Supplement  DVT PPx: lovenox  Code status: FULL CODE        Objective:   Patient Vitals for the past 24 hrs:   Temp Pulse Resp BP SpO2   09/10/22 1032 -- 82 -- (!) 142/84 --   09/10/22 0801 -- 97 18 -- 95 %   09/10/22 0746 98.2 °F (36.8 °C) 82 15 (!) 142/84 100 %   09/10/22 0420 98 °F (36.7 °C) 79 21 112/76 97 %   09/09/22 2340 98.2 °F (36.8 °C) (!) 101 20 93/67 93 %   09/09/22 2019 -- -- -- -- 93 %   09/09/22 1949 98.1 °F (36.7 °C) 83 18 103/71 95 %   09/09/22 1510 98.1 °F (36.7 °C) 74 18 130/73 95 %       Estimated body mass index is 16.1 kg/m² as calculated from the following:    Height as of this encounter: 5' 10\" (1.778 m). Weight as of this encounter: 112 lb 3.4 oz (50.9 kg). Intake/Output Summary (Last 24 hours) at 9/10/2022 1125  Last data filed at 9/10/2022 0420  Gross per 24 hour   Intake --   Output 975 ml   Net -975 ml         Physical Exam:     Blood pressure (!) 142/84, pulse 82, temperature 98.2 °F (36.8 °C), temperature source Oral, resp. rate 18, height 5' 10\" (1.778 m), weight 112 lb 3.4 oz (50.9 kg), SpO2 95 %. General:    No overt distress  Head:  Normocephalic, atraumatic  Eyes:  Sclerae appear normal.  Pupils equally round. ENT:  Nares appear normal, no drainage. Moist oral mucosa  Neck:  No restricted ROM. Trachea midline   CV:   RRR. No m/r/g. No jugular venous distension. Lungs:   Scattered wheezing. Good air flow   Abdomen: Bowel sounds present. Soft, nontender, nondistended. Extremities: No cyanosis or clubbing. No edema  Skin:     No rashes and normal coloration. Warm and dry. Neuro:  CN II-XII grossly intact. A&Ox3  Psych:  Normal mood and affect. I have reviewed ordered lab tests and independently visualized imaging below:    Recent Labs:  No results found for this or any previous visit (from the past 48 hour(s)).       [unfilled]    Other Studies:  [unfilled]    Current Meds:  Current Facility-Administered Medications   Medication Dose Route Frequency    methylPREDNISolone sodium (SOLU-MEDROL) injection 40 mg  40 mg IntraVENous Daily    ipratropium-albuterol (DUONEB) nebulizer solution 1 ampule  1 ampule Inhalation 4x daily    atenolol (TENORMIN) tablet 12.5 mg  12.5 mg Oral Daily    guaiFENesin (MUCINEX) extended release tablet 600 mg  600 mg Oral BID    ipratropium-albuterol (DUONEB) nebulizer solution 1 ampule  1 ampule Inhalation Q4H PRN    aspirin chewable tablet 81 mg  81 mg Oral Daily    mometasone-formoterol (DULERA) 200-5 MCG/ACT inhaler 2 puff  2 puff Inhalation BID    atorvastatin (LIPITOR) tablet 40 mg  40 mg Oral Nightly    ezetimibe (ZETIA) tablet 10 mg  10 mg Oral Nightly    sodium chloride flush 0.9 % injection 5-40 mL  5-40 mL IntraVENous 2 times per day    sodium chloride flush 0.9 % injection 5-40 mL  5-40 mL IntraVENous PRN    0.9 % sodium chloride infusion   IntraVENous PRN    enoxaparin (LOVENOX) injection 40 mg  40 mg SubCUTAneous Q24H    ondansetron (ZOFRAN-ODT) disintegrating tablet 4 mg  4 mg Oral Q8H PRN    Or    ondansetron (ZOFRAN) injection 4 mg  4 mg IntraVENous Q6H PRN    polyethylene glycol (GLYCOLAX) packet 17 g  17 g Oral Daily PRN    acetaminophen (TYLENOL) tablet 650 mg  650 mg Oral Q6H PRN    Or    acetaminophen (TYLENOL) suppository 650 mg  650 mg Rectal Q6H PRN    cefTRIAXone (ROCEPHIN) 1,000 mg in sodium chloride 0.9 % 50 mL IVPB mini-bag  1,000 mg IntraVENous Q24H    And    doxycycline hyclate (VIBRAMYCIN) capsule 100 mg  100 mg Oral 2 times per day    metoprolol (LOPRESSOR) injection 5 mg  5 mg IntraVENous Q6H PRN       Signed:  Boris Mills, APRN - CNP    Part of this note may have been written by using a voice dictation software. The note has been proof read but may still contain some grammatical/other typographical errors.

## 2022-09-10 NOTE — PROGRESS NOTES
Patient is sleeping comfortably in bed. No distress noted. Pt instructed to call for assistance. Call light within reach. Will continue to monitor.

## 2022-09-10 NOTE — PROGRESS NOTES
Patient is sleeping in bed. Respirations even and unlabored on 6 L NC. No needs expressed at this time. No distress noted. Pulse oximetry in place. Safety measures in place. Will prepare to give report to oncoming RN.

## 2022-09-11 PROCEDURE — 6360000002 HC RX W HCPCS: Performed by: NURSE PRACTITIONER

## 2022-09-11 PROCEDURE — 6370000000 HC RX 637 (ALT 250 FOR IP): Performed by: FAMILY MEDICINE

## 2022-09-11 PROCEDURE — 1100000000 HC RM PRIVATE

## 2022-09-11 PROCEDURE — 6370000000 HC RX 637 (ALT 250 FOR IP): Performed by: NURSE PRACTITIONER

## 2022-09-11 PROCEDURE — 6360000002 HC RX W HCPCS: Performed by: HOSPITALIST

## 2022-09-11 PROCEDURE — 2700000000 HC OXYGEN THERAPY PER DAY

## 2022-09-11 PROCEDURE — 2580000003 HC RX 258: Performed by: HOSPITALIST

## 2022-09-11 PROCEDURE — 94760 N-INVAS EAR/PLS OXIMETRY 1: CPT

## 2022-09-11 PROCEDURE — 94640 AIRWAY INHALATION TREATMENT: CPT

## 2022-09-11 RX ORDER — PREDNISONE 20 MG/1
20 TABLET ORAL DAILY
Status: DISCONTINUED | OUTPATIENT
Start: 2022-09-12 | End: 2022-09-12 | Stop reason: HOSPADM

## 2022-09-11 RX ADMIN — GUAIFENESIN 600 MG: 600 TABLET ORAL at 09:49

## 2022-09-11 RX ADMIN — ATENOLOL 12.5 MG: 25 TABLET ORAL at 09:50

## 2022-09-11 RX ADMIN — EZETIMIBE 10 MG: 10 TABLET ORAL at 21:10

## 2022-09-11 RX ADMIN — GUAIFENESIN 600 MG: 600 TABLET ORAL at 21:10

## 2022-09-11 RX ADMIN — MOMETASONE FUROATE AND FORMOTEROL FUMARATE DIHYDRATE 2 PUFF: 200; 5 AEROSOL RESPIRATORY (INHALATION) at 08:23

## 2022-09-11 RX ADMIN — SODIUM CHLORIDE, PRESERVATIVE FREE 10 ML: 5 INJECTION INTRAVENOUS at 09:49

## 2022-09-11 RX ADMIN — MOMETASONE FUROATE AND FORMOTEROL FUMARATE DIHYDRATE 2 PUFF: 200; 5 AEROSOL RESPIRATORY (INHALATION) at 20:18

## 2022-09-11 RX ADMIN — METHYLPREDNISOLONE SODIUM SUCCINATE 40 MG: 40 INJECTION, POWDER, FOR SOLUTION INTRAMUSCULAR; INTRAVENOUS at 09:49

## 2022-09-11 RX ADMIN — ENOXAPARIN SODIUM 40 MG: 100 INJECTION SUBCUTANEOUS at 09:49

## 2022-09-11 RX ADMIN — IPRATROPIUM BROMIDE AND ALBUTEROL SULFATE 1 AMPULE: .5; 3 SOLUTION RESPIRATORY (INHALATION) at 11:06

## 2022-09-11 RX ADMIN — ASPIRIN 81 MG: 81 TABLET, CHEWABLE ORAL at 09:49

## 2022-09-11 RX ADMIN — IPRATROPIUM BROMIDE AND ALBUTEROL SULFATE 1 AMPULE: .5; 3 SOLUTION RESPIRATORY (INHALATION) at 14:50

## 2022-09-11 RX ADMIN — IPRATROPIUM BROMIDE AND ALBUTEROL SULFATE 1 AMPULE: .5; 3 SOLUTION RESPIRATORY (INHALATION) at 08:23

## 2022-09-11 RX ADMIN — ATORVASTATIN CALCIUM 40 MG: 40 TABLET, FILM COATED ORAL at 21:10

## 2022-09-11 RX ADMIN — SODIUM CHLORIDE, PRESERVATIVE FREE 5 ML: 5 INJECTION INTRAVENOUS at 21:10

## 2022-09-11 NOTE — PROGRESS NOTES
Patient is sleeping in bed. Respirations present. Pulse oximetry in place. Call light within reach. Will continue to monitor.

## 2022-09-11 NOTE — PROGRESS NOTES
Patient is resting in bed. Alert and oriented times 4. Respirations even and unlabored on 2 L NC. Pulse oximetry in place. Pt denies pain at this time. No distress noted. Pt instructed to call for assistance. Call light within reach. Will continue to monitor. Received report from oncoming RN. Will continue to monitor.

## 2022-09-11 NOTE — PROGRESS NOTES
Hospitalist Progress Note   Admit Date:  9/3/2022  9:17 PM   Name:  Ari Brown   Age:  71 y.o. Sex:  male  :  1950   MRN:  [id number]   Room:  0/    Presenting Complaint: Shortness of Breath    Reason(s) for Admission: Shortness of breath [R06.02]  Community acquired pneumonia due to Pneumococcus [facility]) Sarai Christianson  COVID-19 virus infection [U07.1]     Hospital Course & Interval History:   Ari Brown is a 71years old  male with past medical history of COPD on supplemental oxygen as needed basis, carotid disease, dyslipidemia, hypertension who presented to emergency room with chief complaint of worsening shortness of breath, cough, wheezing. Patient was desaturating to mid 80s, initiated on supplemental oxygen. Reports testing COVID positive 2 days PTA but negative per RVP    Subjective/24hr Events (22): Denies SOB. No significant events overnight. Rehab pending     ROS:  10 systems reviewed and negative except as noted above. Assessment & Plan:   Acute hypoxic respiratory failure: I  22  -Likely 2/2 CAP/COPD exacerbation 2/2 continued tobacco use. Endorses 1 PPD habit   -supplemental oxygen at 4L with sats in mid 90s; wean as able   -patient was using oxygen as needed at home   -Smoking cessation advised   -RVP panel negative; Bcx NGTD  -CXR 22 with hyperinflated lungs but clear  -Rehab pending at [facility]   -Failed 6-minute walk test. Will need O2 at discharge. Currently on 4L with sats in mid to upper 90s  --oral prednisone started. Titrate at discharge  -OOB often   -Weaned to 3L    Principal problem:  Community-acquired pneumonia:  9/10/22  -Abx complete today; Leukocytosis resolved  -Final Bcx negative    Hypokalemia(Resolved)    Tobacco Use  -Nicotine patch  -Endorses 1 PPD habit  -Cessation advised       Dyslipidemia  -statin     Diet:  ADULT DIET; Regular; 3 carb choices (45 gm/meal);  Chop Meats - patient has no teeth  ADULT ORAL NUTRITION SUPPLEMENT; Breakfast, Lunch, Dinner; Standard High Calorie/High Protein Oral Supplement  DVT PPx: lovenox  Code status: FULL CODE        Objective:   Patient Vitals for the past 24 hrs:   Temp Pulse Resp BP SpO2   09/11/22 0950 -- 83 -- (!) 141/83 --   09/11/22 0824 -- 85 18 -- 94 %   09/11/22 0809 97.5 °F (36.4 °C) 83 19 (!) 141/83 94 %   09/11/22 0338 97.6 °F (36.4 °C) 73 17 100/71 96 %   09/10/22 2302 97.4 °F (36.3 °C) 82 17 111/72 93 %   09/10/22 2016 -- -- -- -- 94 %   09/10/22 1944 97.3 °F (36.3 °C) 87 17 100/76 98 %   09/10/22 1629 -- 77 16 -- 95 %   09/10/22 1513 97.3 °F (36.3 °C) 81 14 124/82 98 %   09/10/22 1211 97.7 °F (36.5 °C) 84 13 116/73 99 %   09/10/22 1140 -- 88 17 -- 98 %       Estimated body mass index is 16.1 kg/m² as calculated from the following:    Height as of this encounter: 5' 10\" (1.778 m). Weight as of this encounter: 112 lb 3.4 oz (50.9 kg). No intake or output data in the 24 hours ending 09/11/22 1053        Physical Exam:     Blood pressure (!) 141/83, pulse 83, temperature 97.5 °F (36.4 °C), resp. rate 18, height 5' 10\" (1.778 m), weight 112 lb 3.4 oz (50.9 kg), SpO2 94 %. General:    No overt distress  Head:  Normocephalic, atraumatic  Eyes:  Sclerae appear normal.  Pupils equally round. ENT:  Nares appear normal, no drainage. Moist oral mucosa  Neck:  No restricted ROM. Trachea midline   CV:   RRR. No m/r/g. No jugular venous distension. Lungs:   Scattered wheezing. Good air flow   Abdomen: Bowel sounds present. Soft, nontender, nondistended. Extremities: No cyanosis or clubbing. No edema  Skin:     No rashes and normal coloration. Warm and dry. Neuro:  CN II-XII grossly intact. A&Ox3  Psych:  Normal mood and affect. I have reviewed ordered lab tests and independently visualized imaging below:    Recent Labs:  No results found for this or any previous visit (from the past 48 hour(s)).     Other Studies:      Current Meds:  Current Facility-Administered Medications Medication Dose Route Frequency    methylPREDNISolone sodium (SOLU-MEDROL) injection 40 mg  40 mg IntraVENous Daily    ipratropium-albuterol (DUONEB) nebulizer solution 1 ampule  1 ampule Inhalation 4x daily    atenolol (TENORMIN) tablet 12.5 mg  12.5 mg Oral Daily    guaiFENesin (MUCINEX) extended release tablet 600 mg  600 mg Oral BID    ipratropium-albuterol (DUONEB) nebulizer solution 1 ampule  1 ampule Inhalation Q4H PRN    aspirin chewable tablet 81 mg  81 mg Oral Daily    mometasone-formoterol (DULERA) 200-5 MCG/ACT inhaler 2 puff  2 puff Inhalation BID    atorvastatin (LIPITOR) tablet 40 mg  40 mg Oral Nightly    ezetimibe (ZETIA) tablet 10 mg  10 mg Oral Nightly    sodium chloride flush 0.9 % injection 5-40 mL  5-40 mL IntraVENous 2 times per day    sodium chloride flush 0.9 % injection 5-40 mL  5-40 mL IntraVENous PRN    0.9 % sodium chloride infusion   IntraVENous PRN    enoxaparin (LOVENOX) injection 40 mg  40 mg SubCUTAneous Q24H    ondansetron (ZOFRAN-ODT) disintegrating tablet 4 mg  4 mg Oral Q8H PRN    Or    ondansetron (ZOFRAN) injection 4 mg  4 mg IntraVENous Q6H PRN    polyethylene glycol (GLYCOLAX) packet 17 g  17 g Oral Daily PRN    acetaminophen (TYLENOL) tablet 650 mg  650 mg Oral Q6H PRN    Or    acetaminophen (TYLENOL) suppository 650 mg  650 mg Rectal Q6H PRN    metoprolol (LOPRESSOR) injection 5 mg  5 mg IntraVENous Q6H PRN       Signed:  VICKI Ortega - MAGDALENE    Part of this note may have been written by using a voice dictation software. The note has been proof read but may still contain some grammatical/other typographical errors.

## 2022-09-12 ENCOUNTER — TELEPHONE (OUTPATIENT)
Dept: FAMILY MEDICINE CLINIC | Facility: CLINIC | Age: 72
End: 2022-09-12

## 2022-09-12 VITALS
SYSTOLIC BLOOD PRESSURE: 93 MMHG | WEIGHT: 112.21 LBS | TEMPERATURE: 98.2 F | OXYGEN SATURATION: 94 % | DIASTOLIC BLOOD PRESSURE: 66 MMHG | BODY MASS INDEX: 16.06 KG/M2 | RESPIRATION RATE: 18 BRPM | HEART RATE: 87 BPM | HEIGHT: 70 IN

## 2022-09-12 PROCEDURE — 94640 AIRWAY INHALATION TREATMENT: CPT

## 2022-09-12 PROCEDURE — 2580000003 HC RX 258: Performed by: HOSPITALIST

## 2022-09-12 PROCEDURE — 2500000003 HC RX 250 WO HCPCS: Performed by: INTERNAL MEDICINE

## 2022-09-12 PROCEDURE — 2700000000 HC OXYGEN THERAPY PER DAY

## 2022-09-12 PROCEDURE — 6370000000 HC RX 637 (ALT 250 FOR IP): Performed by: NURSE PRACTITIONER

## 2022-09-12 PROCEDURE — 6370000000 HC RX 637 (ALT 250 FOR IP): Performed by: FAMILY MEDICINE

## 2022-09-12 PROCEDURE — 6360000002 HC RX W HCPCS: Performed by: HOSPITALIST

## 2022-09-12 RX ORDER — ATENOLOL 50 MG/1
25 TABLET ORAL DAILY
Qty: 90 TABLET | Refills: 0 | Status: SHIPPED | OUTPATIENT
Start: 2022-09-12 | End: 2022-10-05

## 2022-09-12 RX ORDER — PREDNISONE 1 MG/1
TABLET ORAL
Qty: 13 TABLET | Refills: 0 | Status: SHIPPED | OUTPATIENT
Start: 2022-09-13 | End: 2022-09-20

## 2022-09-12 RX ADMIN — Medication 5 UNITS: at 11:17

## 2022-09-12 RX ADMIN — IPRATROPIUM BROMIDE AND ALBUTEROL SULFATE 1 AMPULE: .5; 3 SOLUTION RESPIRATORY (INHALATION) at 08:33

## 2022-09-12 RX ADMIN — SODIUM CHLORIDE, PRESERVATIVE FREE 5 ML: 5 INJECTION INTRAVENOUS at 08:55

## 2022-09-12 RX ADMIN — PREDNISONE 20 MG: 20 TABLET ORAL at 08:55

## 2022-09-12 RX ADMIN — MOMETASONE FUROATE AND FORMOTEROL FUMARATE DIHYDRATE 2 PUFF: 200; 5 AEROSOL RESPIRATORY (INHALATION) at 08:32

## 2022-09-12 RX ADMIN — GUAIFENESIN 600 MG: 600 TABLET ORAL at 08:55

## 2022-09-12 RX ADMIN — ATENOLOL 12.5 MG: 25 TABLET ORAL at 08:55

## 2022-09-12 RX ADMIN — ENOXAPARIN SODIUM 40 MG: 100 INJECTION SUBCUTANEOUS at 08:55

## 2022-09-12 RX ADMIN — ASPIRIN 81 MG: 81 TABLET, CHEWABLE ORAL at 08:55

## 2022-09-12 NOTE — DISCHARGE SUMMARY
Hospitalist Discharge Summary   Admit Date:  9/3/2022  9:17 PM   DC Note date: 2022  Name:  Nathalie Romero   Age:  71 y.o. Sex:  male  :  1950   MRN:  [id number]   Room:  [facility]  PCP:  Isaias Miles MD    Presenting Complaint: Shortness of Breath     Initial Admission Diagnosis: Shortness of breath [R06.02]  Community acquired pneumonia due to Pneumococcus ([facility] Ambar Horn  COVID-19 virus infection [U07.1]     Problem List for this Hospitalization (present on admission):    Principal Problem:    Community acquired pneumonia due to Pneumococcus [facility])  Active Problems:    Acute respiratory failure with hypoxia ([facility]    Pneumonia due to COVID-19 virus    Severe protein-calorie malnutrition ([facility]    Mixed hyperlipidemia    COPD with acute exacerbation ([facility]  Resolved Problems:    * No resolved hospital problems. *      Hospital Course:  Nathalie Romero is a 71year old  male with a past medical history of COPD on supplemental oxygen as needed basis, carotid disease, dyslipidemia, hypertension who presented to the ER with a chief complaint of worsening shortness of breath, cough, wheezing. Patient was desaturating to mid 80s on room air. Reports testing COVID positive 2 days PTA but negative per RVP here. He was placed on supplemental O2 and steroids started. There was concern for pneumonia on imaging at presentation so he was started on antibiotics; this course was completed on Sep/10. His blood cultures finalized negative. Based on his 6-minute walk test, he does qualify for supplemental O2 at discharge. Mr. Barroso Mail stated that he does feel better and is ready for discharge to rehab. We ask that he follow up with his PCP in 10-14 days for a check up. He will be discharged to [facility] on Sep/2022.       A&P  Acute hypoxic respiratory failure 2/2 CAP and COPD exacerbation  -supplemental oxygen at 3L with sats in mid 90s; wean as able   -patient was using oxygen as needed at home -Smoking cessation advised   -RVP panel negative; Bcx finalized negative  -CXR 9/6/22 with hyperinflated lungs but clear  -Based on 6-minute walk test he does qualify for O2 at discharge.  -Titrate prednisone at discharge     Principal problem:  Community-acquired pneumonia:  -Abx completed on Sep/10  -Final Bcx negative     Hypokalemia (Resolved)     Tobacco habituation  -Nicotine patch  -Endorses 1 PPD habit  -Cessation advised       Dyslipidemia  -statin      Disposition: STR per PT/OT    Diet: ADULT DIET; Regular; 3 carb choices (45 gm/meal); Chop Meats - patient has no teeth  ADULT ORAL NUTRITION SUPPLEMENT; Breakfast, Lunch, Dinner; Standard High Calorie/High Protein Oral Supplement  Code Status: Full Code    Follow Ups:   Contact information for follow-up providers     Jerman Alejandre MD Follow up. Specialty: Family Medicine  Why: post discharge check up  Contact information:  [facility] [address] [facility] [address]  [phone]                   Contact information for after-discharge care     Discharge [address]) . Service: Skilled Nursing  Contact information:  [address] [address]  [phone]                           Time spent in patient discharge and coordination 36 minutes. Plan was discussed with the patient. All questions answered. Patient was stable at time of discharge. Instructions given to call a physician or return if any concerns. Current Discharge Medication List        CONTINUE these medications which have CHANGED    Details   atenolol (TENORMIN) 50 MG tablet Take 0.5 tablets by mouth daily Take 1 tablet by mouth one time daily.   Qty: 90 tablet, Refills: 0    Associated Diagnoses: Essential hypertension, benign      predniSONE (DELTASONE) 5 MG tablet Take 4 tablets by mouth daily for 1 day, THEN 2 tablets daily for 3 days, THEN 1 tablet daily for 3 days.  Lonny Eid: 13 tablet, Refills: 0           CONTINUE these medications which have NOT CHANGED    Details   fluticasone-salmeterol (ADVAIR) 250-50 MCG/ACT AEPB diskus inhaler Inhale 1 puff into the lungs in the morning and 1 puff in the evening. Qty: 1 each, Refills: 3    Associated Diagnoses: Chronic obstructive pulmonary disease, unspecified COPD type (HCC)      ezetimibe (ZETIA) 10 MG tablet Take 1 tablet by mouth daily  Qty: 90 tablet, Refills: 3    Associated Diagnoses: Mixed hyperlipidemia      albuterol sulfate  (90 Base) MCG/ACT inhaler Inhale 2 puffs into the lungs every 6 hours as needed      aspirin 325 MG tablet Take 81 mg by mouth      atorvastatin (LIPITOR) 40 MG tablet Take 40 mg by mouth daily      nitroGLYCERIN (NITROSTAT) 0.4 MG SL tablet Place 0.4 mg under the tongue           STOP taking these medications       nirmatrelvir/ritonavir (PAXLOVID) 20 x 150 MG & 10 x 100MG TBPK Comments:   Reason for Stopping:               Procedures done this admission:  * No surgery found *    Consults this admission:  None    Echocardiogram results:  09/23/21    TRANSTHORACIC ECHOCARDIOGRAM (TTE) COMPLETE (CONTRAST/BUBBLE/3D PRN) 09/23/2021  5:33 PM (Final)    Narrative  This is a summary report. The complete report is available in the patient's medical record. If you cannot access the medical record, please contact the sending organization for a detailed fax or copy. · Image quality for this study was technically difficult. · LV: Estimated LVEF is 50 - 55%. Normal cavity size, wall thickness and systolic function (ejection fraction normal). Abnormal left ventricular septal motion consistent with interventricular conduction delay (IVCD). Signed by: Albert Lorenzo on 9/23/2021  5:33 PM      Diagnostic Imaging/Tests:   XR CHEST PORTABLE    Result Date: 9/6/2022  Lungs appear hyperinflated but are otherwise clear. No new infiltrate or lung consolidation.  Basilar airspace opacities on prior exam have cleared. Apical lung scarring on the right is unchanged. The heart is normal in size. No pneumothorax. No pleural effusions. Lower cervical fusion plate is noted. XR CHEST PORTABLE    Result Date: 9/3/2022  1. Bibasilar interstitial densities, nonspecific but may be secondary to aspiration, atelectasis or edema. 2. Emphysema. Labs: Results:       BMP, Mg, Phos No results for input(s): NA, K, CL, CO2, ANIONGAP, BUN, CREATININE, LABGLOM, GFRAA, CALCIUM, GLUCOSE, MG, PHOS in the last 72 hours. CBC No results for input(s): WBC, RBC, HGB, HCT, MCV, MCH, MCHC, RDW, PLT, MPV, NRBC, SEGS, LYMPHOPCT, EOSRELPCT, MONOPCT, BASOPCT, IMMGRAN, SEGSABS, LYMPHSABS, EOSABS, MONOSABS, BASOSABS, ABSIMMGRAN in the last 72 hours. LFT No results for input(s): BILITOT, BILIDIR, ALKPHOS, AST, ALT, PROT, LABALBU, GLOB in the last 72 hours. Cardiac  Lab Results   Component Value Date/Time    NTPROBNP 2,502 09/03/2022 09:29 PM      Coags No results found for: PROTIME, INR, APTT   A1c No results found for: LABA1C, EAG   Lipids Lab Results   Component Value Date/Time    CHOL 117 08/18/2022 10:14 AM    LDLCALC 63.2 08/18/2022 10:14 AM    LABVLDL 11.8 08/18/2022 10:14 AM    HDL 42 08/18/2022 10:14 AM    CHOLHDLRATIO 2.8 08/18/2022 10:14 AM    TRIG 59 08/18/2022 10:14 AM      Thyroid  No results found for: Devoria Mater     Most Recent UA No results found for: COLORU, APPEARANCE, SPECGRAV, LABPH, PROTEINU, GLUCOSEU, KETUA, BILIRUBINUR, BLOODU, UROBILINOGEN, NITRU, LEUKOCYTESUR, [address], RBCUA, EPITHUA, BACTERIA, LABCAST, MUCUS     Recent Labs     09/03/22  2234   CULTURE NO GROWTH 5 DAYS  NO GROWTH 5 DAYS       All Labs from Last 24 Hrs:  No results found for this or any previous visit (from the past 24 hour(s)).     No Known Allergies  Immunization History   Administered Date(s) Administered    COVID-19, PFIZER PURPLE top, DILUTE for use, (age 15 y+), 30mcg/0.3mL 03/04/2021, 03/25/2021    Influenza, High Dose (Fluzone 65 yrs and older) 10/25/2017, 11/14/2018, 12/22/2021    Influenza, Triv, inactivated, subunit, adjuvanted, IM (Fluad 65 yrs and older) 11/11/2019, 12/17/2020    Pneumococcal Conjugate 13-valent ([id number]) 10/25/2017    Pneumococcal Polysaccharide (Rxafimegi95) 02/08/2019       Recent Vital Data:  Patient Vitals for the past 24 hrs:   Temp Pulse Resp BP SpO2   09/12/22 0930 -- -- -- -- 92 %   09/12/22 0834 -- 100 16 -- 90 %   09/12/22 0716 97.5 °F (36.4 °C) 86 20 131/75 95 %   09/12/22 0335 97.5 °F (36.4 °C) 81 17 111/76 94 %   09/11/22 2330 97.7 °F (36.5 °C) 77 16 112/74 94 %   09/11/22 1928 97.5 °F (36.4 °C) 93 16 111/77 94 %   09/11/22 1521 97.3 °F (36.3 °C) 92 18 93/77 95 %   09/11/22 1144 97.3 °F (36.3 °C) (!) 101 18 93/72 94 %   09/11/22 1107 -- (!) 113 16 -- 95 %       Oxygen Therapy  SpO2: 92 %  Pulse Oximeter Device Mode: Continuous  Pulse Oximeter Device Location: Right, Finger  O2 Device: Nasal cannula  FiO2 : 28 %  O2 Flow Rate (L/min): 5 L/min    Estimated body mass index is 16.1 kg/m² as calculated from the following:    Height as of this encounter: 5' 10\" (1.778 m). Weight as of this encounter: 112 lb 3.4 oz (50.9 kg). Intake/Output Summary (Last 24 hours) at 9/12/2022 1056  Last data filed at 9/11/2022 1935  Gross per 24 hour   Intake 945 ml   Output --   Net 945 ml         Physical Exam:  General:    No overt distress  Head:  Normocephalic, atraumatic  Eyes:  Sclerae appear normal.  Pupils equally round. HENT:  Nares appear normal, no drainage. Moist mucous membranes  Neck:  No restricted ROM. Trachea midline  CV:   RRR. No m/r/g. Lungs:   No rales. Respirations even, unlabored at rest on 3 L. Abdomen:   Soft, nondistended. Extremities: Warm and dry. No cyanosis or clubbing. Skin:     No rashes. Normal coloration  Neuro:  CN II-XII grossly intact. Psych:  Normal mood and affect.     Signed:  VICKI Becerril - CNP    Part of this note may have been written by using a voice dictation software. The note has been proof read but may still contain some grammatical/other typographical errors.

## 2022-09-12 NOTE — PROGRESS NOTES
Pt is laying in bed with no complains of pain or discomfort at this time. Pt remains on 2L NC with even and unlabored respirations. Pt has continuous 02 monitor at bedside. Pt is alert and oriented times 4. Call light is in place.

## 2022-09-12 NOTE — PROGRESS NOTES
Pt is resting in bed with no complaints of pain or discomfort at this time.  Pt is 2L NC with even and unlabored respirations

## 2022-09-12 NOTE — PROGRESS NOTES
Beside shift report received from [address]  Patient lying in bed  Respirations present even and unlabored on 2L NC  O2 sat 93% via oxynet  No signs of distress  No needs expressed at this time  Safety measures in place

## 2022-09-12 NOTE — TELEPHONE ENCOUNTER
----- Message from Jeni Lowry sent at 9/12/2022 11:11 AM EDT -----  Subject: Message to Provider    QUESTIONS  Information for Provider? Hector Vidal from [facility] called to [facility]   follow up for pt--was admttd [id number] [facility] [id number] for did not with reason   for being admttd ---please call Hector Vidal to set up appt for pt  ---------------------------------------------------------------------------  --------------  [id number] [employer]  [phone]; OK to leave message on voicemail  ---------------------------------------------------------------------------  --------------  SCRIPT ANSWERS  Relationship to Patient? Third Party  Third Party Type? Hospital?   Representative Name?  Hector Vidal

## 2022-09-12 NOTE — PROGRESS NOTES
Verbal report given to [facility] on Yakelin May  being discharged to [address] [facility]       Report consisted of patients Situation, Background, Assessment and   Recommendations    Opportunity for questions and clarification was provided.       Patient to be transported with:  [address] up time: 1300

## 2022-09-12 NOTE — PROGRESS NOTES
Attempted to call report to [facility]  Nurse unavailable to take report at this time  Call back number given to   Nurse to call to receive report

## 2022-09-12 NOTE — CARE COORDINATION
09/12/22 1117   Discharge Planning   Type of [address] Discharge   Transition of Care Consult (CM Consult) SNF   Partner SNF Yes   [address] (SNF)   [address] Provided? No   Mode of Transport at Discharge [address] [facility] Time of Discharge 1300   Condition of Participation: Discharge Planning   The Plan for Transition of Care is related to the following treatment goals: discharging to snf for rehab   The Patient and/or Patient Representative was provided with a Choice of Provider? Patient   The Patient and/Or Patient Representative agree with the Discharge Plan? Yes   Freedom of Choice list was provided with basic dialogue that supports the patient's individualized plan of care/goals, treatment preferences, and shares the quality data associated with the providers? Yes   Patient is discharging via [facility] ambulance to [facility]. Patient is agreeable to this plan. Room and report line given to RN.

## 2022-09-13 ENCOUNTER — CARE COORDINATION (OUTPATIENT)
Dept: OTHER | Facility: CLINIC | Age: 72
End: 2022-09-13

## 2022-09-26 ENCOUNTER — CARE COORDINATION (OUTPATIENT)
Dept: OTHER | Facility: CLINIC | Age: 72
End: 2022-09-26

## 2022-09-26 NOTE — CARE COORDINATION
Care Transitions Post-Acute Facility Discharge Call    2022    Patient: Brenton Nicole Patient : 1950   MRN: [id number]  Reason for Admission: CAP, Covid 19 virus  Discharge Date: 22 RARS: Readmission Risk Score: 7.3    Transitions of Care Initial Call    Was this an external facility discharge? Yes, 22  [facility]   Additional needs identified to be addressed with provider: No  none             Method of communication with provider : none    Advance Care Planning:   Does patient have an Advance Directive: not on file; education provided. Care Transition Nurse contacted the patient by telephone to perform post hospital discharge assessment. Verified name and  with patient as identifiers. Provided introduction to self, and explanation of the CTN role. CTN reviewed discharge instructions, medical action plan and red flags with patient who verbalized understanding. Patient given an opportunity to ask questions and does not have any further questions or concerns at this time. Were discharge instructions available to patient? Yes. Reviewed appropriate site of care based on symptoms and resources available to patient including: PCP  Specialist  Home health  When to call 911  Condition related references. The patient agrees to contact the PCP office for questions related to their healthcare. Patient will have [facility] [address] and start of care is scheduled for 22 per staff at agency. Medication reconciliation was performed with patient, who verbalizes understanding of administration of home medications. Was patient discharged with a pulse oximeter?  no    Care Transitions Post Acute Facility Transition      Do you have any ongoing symptoms?: No   Do you have all of your prescriptions and are they filled?: Yes   Do you have any questions related to your medications?: No   Have you scheduled your follow up appointment?: Yes   How are you going to get to your appointment?: Car - family or friend to transport   Were you discharged with any Home Care or [facility] or do you currently have any active services?: Yes   Post Acute Services: [address] you feel like you have everything you need to keep you well at home?: Yes   Care Transitions Interventions  Schedule Follow Up Appointment with Physician: Completed        Goals Addressed                   This Visit's Progress     Conditions and Symptoms        I will schedule office visits, as directed by my provider. I will keep my appointment or reschedule if I have to cancel. I will follow my Zone Management tool to seek urgent or emergent care. I will notify my provider of any symptoms that indicate a worsening of my condition. Barriers: lack of motivation  Plan for overcoming my barriers: [facility] home health care, CTN telephonic outreach  Confidence: 6/10  Anticipated Goal Completion Date: 10/14/22                 Future Appointments   Date Time Provider Claribel Domingo   10/5/2022  2:30 PM MD DARIUS Disla   11/23/2022  8:10 AM MD DARIUS Disla     CTN provided contact information. Plan for follow up based on severity of symptoms and risk factors. Plan for next call: self management-of medical conditions and follow up appointment.      Kingsley Hicks RN

## 2022-10-05 ENCOUNTER — OFFICE VISIT (OUTPATIENT)
Dept: FAMILY MEDICINE CLINIC | Facility: CLINIC | Age: 72
End: 2022-10-05
Payer: MEDICARE

## 2022-10-05 VITALS
HEIGHT: 70 IN | OXYGEN SATURATION: 96 % | SYSTOLIC BLOOD PRESSURE: 112 MMHG | HEART RATE: 104 BPM | BODY MASS INDEX: 16.75 KG/M2 | TEMPERATURE: 98 F | DIASTOLIC BLOOD PRESSURE: 72 MMHG | WEIGHT: 117 LBS

## 2022-10-05 DIAGNOSIS — I10 ESSENTIAL HYPERTENSION, BENIGN: ICD-10-CM

## 2022-10-05 DIAGNOSIS — Z23 NEED FOR INFLUENZA VACCINATION: ICD-10-CM

## 2022-10-05 DIAGNOSIS — E87.6 LOW SERUM POTASSIUM: Primary | ICD-10-CM

## 2022-10-05 PROCEDURE — 99213 OFFICE O/P EST LOW 20 MIN: CPT | Performed by: FAMILY MEDICINE

## 2022-10-05 PROCEDURE — 1123F ACP DISCUSS/DSCN MKR DOCD: CPT | Performed by: FAMILY MEDICINE

## 2022-10-05 RX ORDER — ATENOLOL 25 MG/1
12.5 TABLET ORAL DAILY
Qty: 45 TABLET | Refills: 3 | Status: SHIPPED | OUTPATIENT
Start: 2022-10-05

## 2022-10-05 ASSESSMENT — ENCOUNTER SYMPTOMS
SPUTUM PRODUCTION: 1
VOMITING: 0
SHORTNESS OF BREATH: 1
COUGH: 1
WHEEZING: 1
DIARRHEA: 0
NAUSEA: 0

## 2022-10-05 ASSESSMENT — PATIENT HEALTH QUESTIONNAIRE - PHQ9
SUM OF ALL RESPONSES TO PHQ QUESTIONS 1-9: 2
SUM OF ALL RESPONSES TO PHQ9 QUESTIONS 1 & 2: 2
SUM OF ALL RESPONSES TO PHQ QUESTIONS 1-9: 2
1. LITTLE INTEREST OR PLEASURE IN DOING THINGS: 1
2. FEELING DOWN, DEPRESSED OR HOPELESS: 1
SUM OF ALL RESPONSES TO PHQ QUESTIONS 1-9: 2
SUM OF ALL RESPONSES TO PHQ QUESTIONS 1-9: 2

## 2022-10-05 ASSESSMENT — COPD QUESTIONNAIRES: COPD: 1

## 2022-10-05 NOTE — PROGRESS NOTES
Selwyn Watts (:  1950) is a 71 y.o. male,Established patient, here for evaluation of the following chief complaint(s):  Follow-up (CT  chest abdomen and pelvis) and Other (When he was discharged from the hospital they told him that they sent in a rx for potassium but the pharmacy didn't get it. ) Is doing some better, overall. Weight is up a little from hospital.         ASSESSMENT/PLAN:  1. Low serum potassium- repeat level  -     Basic Metabolic Panel; Future  2. Essential hypertension, benign- good today, but HR up some off atenolol- try going back to 25mg 1/2 tab qd, fu with cardiology asap  Assessment & Plan:   Borderline controlled, continue current medications  Orders:  -     Basic Metabolic Panel; Future  -     atenolol (TENORMIN) 25 MG tablet; Take 0.5 tablets by mouth daily, Disp-45 tablet, R-3Normal  3. Need for influenza vaccination  -     Influenza, FLUAD, (age 72 y+), IM, Preservative Free, 0.5 mL      Return in about 7 weeks (around 2022) for fasting chronic care. Subjective   SUBJECTIVE/OBJECTIVE:  Other  This is a new problem. The current episode started 1 to 4 weeks ago. The problem has been unchanged. Associated symptoms include coughing. Pertinent negatives include no chest pain, chills, fatigue, nausea or vomiting. COPD  He complains of cough, shortness of breath, sputum production and wheezing. This is a chronic problem. The current episode started more than 1 year ago. The problem occurs constantly. The problem has been gradually worsening. Pertinent negatives include no chest pain. Review of Systems   Constitutional:  Negative for chills and fatigue. Respiratory:  Positive for cough, sputum production, shortness of breath and wheezing. Cardiovascular:  Negative for chest pain and leg swelling. Gastrointestinal:  Negative for diarrhea, nausea and vomiting. Objective   Physical Exam  Vitals and nursing note reviewed.    Constitutional: General: He is not in acute distress. Appearance: Normal appearance. HENT:      Head: Normocephalic and atraumatic. Nose: Nose normal.      Mouth/Throat:      Pharynx: Oropharynx is clear. Eyes:      Extraocular Movements: Extraocular movements intact. Conjunctiva/sclera: Conjunctivae normal.   Cardiovascular:      Rate and Rhythm: Normal rate and regular rhythm. Pulses: Normal pulses. Heart sounds: Normal heart sounds. Pulmonary:      Effort: Pulmonary effort is normal. No respiratory distress. Breath sounds: Wheezing and rhonchi present. Musculoskeletal:         General: No swelling or tenderness. Normal range of motion. Cervical back: Normal range of motion and neck supple. Skin:     General: Skin is warm and dry. Neurological:      General: No focal deficit present. Mental Status: He is alert. Mental status is at baseline. Psychiatric:         Mood and Affect: Mood normal.         Behavior: Behavior normal.              An electronic signature was used to authenticate this note.     --Mike Zee MD

## 2022-10-06 ENCOUNTER — CARE COORDINATION (OUTPATIENT)
Dept: OTHER | Facility: CLINIC | Age: 72
End: 2022-10-06

## 2022-10-06 LAB
ANION GAP SERPL CALC-SCNC: 4 MMOL/L (ref 4–13)
BUN SERPL-MCNC: 12 MG/DL (ref 8–23)
CALCIUM SERPL-MCNC: 8.9 MG/DL (ref 8.3–10.4)
CHLORIDE SERPL-SCNC: 103 MMOL/L (ref 101–110)
CO2 SERPL-SCNC: 33 MMOL/L (ref 21–32)
CREAT SERPL-MCNC: 0.7 MG/DL (ref 0.8–1.5)
GLUCOSE SERPL-MCNC: 90 MG/DL (ref 65–100)
POTASSIUM SERPL-SCNC: 3.6 MMOL/L (ref 3.5–5.1)
SODIUM SERPL-SCNC: 140 MMOL/L (ref 138–145)

## 2022-10-06 PROCEDURE — 90694 VACC AIIV4 NO PRSRV 0.5ML IM: CPT | Performed by: FAMILY MEDICINE

## 2022-10-06 PROCEDURE — G0008 ADMIN INFLUENZA VIRUS VAC: HCPCS | Performed by: FAMILY MEDICINE

## 2022-10-06 NOTE — CARE COORDINATION
CTN attempted outreach to patient for SAGAR follow up call. Unable to reach patient. Will attempt outreach again.

## 2022-10-11 ENCOUNTER — CARE COORDINATION (OUTPATIENT)
Dept: OTHER | Facility: CLINIC | Age: 72
End: 2022-10-11

## 2022-10-11 NOTE — CARE COORDINATION
Multiple unsuccessful attempts to reach patient for follow up call. Unable to reach patient. HIPPA compliant message left with contact info requesting a return call and no return call. Will resolve episode at this time.

## 2022-10-21 ENCOUNTER — OFFICE VISIT (OUTPATIENT)
Dept: VASCULAR SURGERY | Age: 72
End: 2022-10-21
Payer: MEDICARE

## 2022-10-21 VITALS
OXYGEN SATURATION: 98 % | WEIGHT: 119 LBS | HEIGHT: 70 IN | BODY MASS INDEX: 17.04 KG/M2 | DIASTOLIC BLOOD PRESSURE: 79 MMHG | TEMPERATURE: 98.1 F | SYSTOLIC BLOOD PRESSURE: 119 MMHG | HEART RATE: 106 BPM

## 2022-10-21 DIAGNOSIS — I71.43 INFRARENAL ABDOMINAL AORTIC ANEURYSM (AAA) WITHOUT RUPTURE: Primary | ICD-10-CM

## 2022-10-21 PROCEDURE — 99204 OFFICE O/P NEW MOD 45 MIN: CPT | Performed by: SURGERY

## 2022-10-21 PROCEDURE — 1123F ACP DISCUSS/DSCN MKR DOCD: CPT | Performed by: SURGERY

## 2022-10-21 NOTE — PROGRESS NOTES
[address]   [address] [facility] FAX: [phone]    Arnold Powell  1950    Chief Complaint   Patient presents with    New Patient    Results     AAA U/S 10/21/22           HPI   Mr. Arnold Powell is a 71y.o. year old male who follow-up for evaluation of aneurysm disease. He has history of tobacco abuse but stopped several years back. He is on home oxygen. Current Outpatient Medications   Medication Sig Dispense Refill    atenolol (TENORMIN) 25 MG tablet Take 0.5 tablets by mouth daily 45 tablet 3    fluticasone-salmeterol (ADVAIR) 250-50 MCG/ACT AEPB diskus inhaler Inhale 1 puff into the lungs in the morning and 1 puff in the evening. 1 each 3    ezetimibe (ZETIA) 10 MG tablet Take 1 tablet by mouth daily 90 tablet 3    albuterol sulfate  (90 Base) MCG/ACT inhaler Inhale 2 puffs into the lungs every 6 hours as needed      aspirin 81 MG EC tablet Take 81 mg by mouth      atorvastatin (LIPITOR) 40 MG tablet Take 40 mg by mouth daily      nitroGLYCERIN (NITROSTAT) 0.4 MG SL tablet Place 0.4 mg under the tongue       No current facility-administered medications for this visit.      No Known Allergies  Past Medical History:   Diagnosis Date    Acute bronchitis     CAD (coronary artery disease)     COPD (chronic obstructive pulmonary disease) (HCC)     Eczema     ED (erectile dysfunction)     Edema     Emphysema/COPD (HCC)     Hypercholesterolemia     Hypertension     Joint pain     Leg cramps     Osteoarthritis     Osteoarthritis of cervical spine     Tobacco use disorder     Warts     Weight loss      Family History   Problem Relation Age of Onset    Cancer Mother         liver    No Known Problems Father      Past Surgical History:   Procedure Laterality Date    CARDIAC CATHETERIZATION      3    CATARACT REMOVAL Bilateral     CORONARY ANGIOPLASTY WITH STENT PLACEMENT      multiple stents     HERNIA REPAIR      double-inguinal    ORTHOPEDIC SURGERY spine surgery (TWICE)    PTCA       Social History     Tobacco Use    Smoking status: Former     Packs/day: 1.00     Types: Cigarettes    Smokeless tobacco: Former   Substance Use Topics    Alcohol use: Yes        Review of Systems  Constitutional: Negative for fever and chills. HENT: Negative for congestion and sore throat. Skin: Negative for rash and itching. Eyes: Negative for blurred vision and double vision. Respiratory: Negative for cough and shortness of breath. Cardiovascular: Negative for chest pain, palpitations, claudication and leg swelling. Gastrointestinal: Negative for nausea, vomiting and abdominal pain. Genitourinary: Negative for dysuria, hematuria and flank pain. Musculoskeletal: Negative for joint pain and falls. Neurological: Negative for dizziness, sensory change, focal weakness, loss of consciousness and headaches. PHYSICAL EXAM   [unfilled]     Constitutional: he appears well-developed. No distress. HENT: Hearing intact. Head: Atraumatic. Eyes: Pupils are equal, round, and reactive to light. Neck: Normal range of motion. Cardiovascular: Regular rhythm. Pulmonary/Chest: Effort normal and breath sounds normal. No respiratory distress. Abdominal: Soft. Bowel sounds are normal. he exhibits no distension. There is no tenderness. There is no guarding. No hernia. Musculoskeletal: Normal range of motion. Neurological: He is alert. CN II- XII grossly intact  Skin: Warm and dry. Vascular: Palpable femoral pulses      Imaging Results  Aorta measuring 3.8 cm    ASSESSMENT AND PLAN   Mr. Arnold Powell is a 71y.o. year old male 3.8 cm aortic aneurysm. Had long discussion with patient in detail. Typically these aneurysms are treated between 5 and 5 and half centimeters. Follow-up in 1 year with a duplex study. Elements of this note have been dictated using speech recognition software. As a result, errors of speech recognition may have occurred.     48 minutes of time was spent on this consult with greater than 50% of time spent face-to-face with the patient discussing the disease process, education and treatment options.

## 2022-11-23 ENCOUNTER — OFFICE VISIT (OUTPATIENT)
Dept: FAMILY MEDICINE CLINIC | Facility: CLINIC | Age: 72
End: 2022-11-23
Payer: MEDICARE

## 2022-11-23 VITALS
SYSTOLIC BLOOD PRESSURE: 110 MMHG | HEIGHT: 70 IN | HEART RATE: 87 BPM | RESPIRATION RATE: 18 BRPM | DIASTOLIC BLOOD PRESSURE: 68 MMHG | WEIGHT: 122 LBS | TEMPERATURE: 98 F | OXYGEN SATURATION: 98 % | BODY MASS INDEX: 17.47 KG/M2

## 2022-11-23 DIAGNOSIS — R09.02 HYPOXIA: ICD-10-CM

## 2022-11-23 DIAGNOSIS — J44.9 CHRONIC OBSTRUCTIVE PULMONARY DISEASE, UNSPECIFIED COPD TYPE (HCC): ICD-10-CM

## 2022-11-23 DIAGNOSIS — Z95.5 S/P CORONARY ARTERY STENT PLACEMENT: ICD-10-CM

## 2022-11-23 DIAGNOSIS — E78.2 MIXED HYPERLIPIDEMIA: ICD-10-CM

## 2022-11-23 DIAGNOSIS — U09.9 POST-COVID-19 CONDITION: ICD-10-CM

## 2022-11-23 DIAGNOSIS — I10 ESSENTIAL HYPERTENSION, BENIGN: Primary | ICD-10-CM

## 2022-11-23 DIAGNOSIS — Z79.82 LONG TERM (CURRENT) USE OF ASPIRIN: ICD-10-CM

## 2022-11-23 DIAGNOSIS — E43 SEVERE PROTEIN-CALORIE MALNUTRITION (HCC): Chronic | ICD-10-CM

## 2022-11-23 DIAGNOSIS — I25.10 CORONARY ARTERY DISEASE INVOLVING NATIVE CORONARY ARTERY OF NATIVE HEART WITHOUT ANGINA PECTORIS: ICD-10-CM

## 2022-11-23 LAB
BASOPHILS # BLD: 0.1 K/UL (ref 0–0.2)
BASOPHILS NFR BLD: 1 % (ref 0–2)
DIFFERENTIAL METHOD BLD: ABNORMAL
EOSINOPHIL # BLD: 0.1 K/UL (ref 0–0.8)
EOSINOPHIL NFR BLD: 1 % (ref 0.5–7.8)
ERYTHROCYTE [DISTWIDTH] IN BLOOD BY AUTOMATED COUNT: 13.7 % (ref 11.9–14.6)
HCT VFR BLD AUTO: 40.9 % (ref 41.1–50.3)
HGB BLD-MCNC: 13.5 G/DL (ref 13.6–17.2)
IMM GRANULOCYTES # BLD AUTO: 0 K/UL (ref 0–0.5)
IMM GRANULOCYTES NFR BLD AUTO: 0 % (ref 0–5)
LYMPHOCYTES # BLD: 1.5 K/UL (ref 0.5–4.6)
LYMPHOCYTES NFR BLD: 23 % (ref 13–44)
MCH RBC QN AUTO: 29.6 PG (ref 26.1–32.9)
MCHC RBC AUTO-ENTMCNC: 33 G/DL (ref 31.4–35)
MCV RBC AUTO: 89.7 FL (ref 82–102)
MONOCYTES # BLD: 0.6 K/UL (ref 0.1–1.3)
MONOCYTES NFR BLD: 9 % (ref 4–12)
NEUTS SEG # BLD: 4.4 K/UL (ref 1.7–8.2)
NEUTS SEG NFR BLD: 66 % (ref 43–78)
NRBC # BLD: 0 K/UL (ref 0–0.2)
PLATELET # BLD AUTO: 185 K/UL (ref 150–450)
PMV BLD AUTO: 10.2 FL (ref 9.4–12.3)
RBC # BLD AUTO: 4.56 M/UL (ref 4.23–5.6)
WBC # BLD AUTO: 6.6 K/UL (ref 4.3–11.1)

## 2022-11-23 PROCEDURE — 3074F SYST BP LT 130 MM HG: CPT | Performed by: FAMILY MEDICINE

## 2022-11-23 PROCEDURE — 3078F DIAST BP <80 MM HG: CPT | Performed by: FAMILY MEDICINE

## 2022-11-23 PROCEDURE — 99214 OFFICE O/P EST MOD 30 MIN: CPT | Performed by: FAMILY MEDICINE

## 2022-11-23 PROCEDURE — 1123F ACP DISCUSS/DSCN MKR DOCD: CPT | Performed by: FAMILY MEDICINE

## 2022-11-23 RX ORDER — ATORVASTATIN CALCIUM 40 MG/1
40 TABLET, FILM COATED ORAL NIGHTLY
Qty: 90 TABLET | Refills: 3 | Status: SHIPPED | OUTPATIENT
Start: 2022-11-23

## 2022-11-23 ASSESSMENT — PATIENT HEALTH QUESTIONNAIRE - PHQ9
SUM OF ALL RESPONSES TO PHQ QUESTIONS 1-9: 0
SUM OF ALL RESPONSES TO PHQ QUESTIONS 1-9: 0
SUM OF ALL RESPONSES TO PHQ9 QUESTIONS 1 & 2: 0
SUM OF ALL RESPONSES TO PHQ QUESTIONS 1-9: 0
2. FEELING DOWN, DEPRESSED OR HOPELESS: 0
SUM OF ALL RESPONSES TO PHQ QUESTIONS 1-9: 0
1. LITTLE INTEREST OR PLEASURE IN DOING THINGS: 0

## 2022-11-23 ASSESSMENT — ENCOUNTER SYMPTOMS
SHORTNESS OF BREATH: 1
COUGH: 1
VOMITING: 0
NAUSEA: 0
DIARRHEA: 0

## 2022-11-23 ASSESSMENT — COPD QUESTIONNAIRES: COPD: 1

## 2022-11-23 NOTE — PROGRESS NOTES
Becca Christina (:  1950) is a 71 y.o. male,Established patient, here for evaluation of the following chief complaint(s):  Follow-up (Chronic care follow up. Fasting. ), Hypertension, and Cholesterol Problem         ASSESSMENT/PLAN:  1. Essential hypertension, benign- well-controlled  -     CBC with Auto Differential; Future  2. Mixed hyperlipidemia- repeat fasting labs  -     Comprehensive Metabolic Panel; Future  -     Lipid Panel; Future  -     atorvastatin (LIPITOR) 40 MG tablet; Take 1 tablet by mouth at bedtime, Disp-90 tablet, R-3Normal  3. Chronic obstructive pulmonary disease, unspecified COPD type ([facility] [address] [facility]  -     CBC with Auto Differential; Future  -     REHABILITATION [facility]  4. Post-COVID-19 condition  -     REHABILITATION [facility]  5. Hypoxia  -     REHABILITATION [facility]  6. Severe protein-calorie malnutrition ([facility] [address]  Assessment & Plan:   Uncontrolled, lifestyle modifications recommended weight is up 3 lbs in past month- weight is slowly improving  7. Adult BMI <19 kg/sq m- BMI handout  8. S/P coronary artery stent placement  Assessment & Plan:   Monitored by specialist- no acute findings meriting change in the plan  Orders:  -     CBC with Auto Differential; Future  9. Coronary artery disease involving native coronary artery of native heart without angina pectoris  Assessment & Plan:   Monitored by specialist- no acute findings meriting change in the plan  Orders:  -     CBC with Auto Differential; Future  10. Long term (current) use of aspirin  -     CBC with Auto Differential; Future      Return in about 3 months (around 2023) for fasting chronic care. Subjective   SUBJECTIVE/OBJECTIVE:  Hypertension  This is a chronic problem. The current episode started more than 1 year ago. The problem is unchanged. The problem is controlled. Associated symptoms include shortness of breath. Pertinent negatives include no chest pain. Hyperlipidemia  This is a chronic problem. The current episode started more than 1 year ago. The problem is controlled. Recent lipid tests were reviewed and are variable. Associated symptoms include shortness of breath. Pertinent negatives include no chest pain. COPD  He complains of cough and shortness of breath. This is a chronic problem. The current episode started more than 1 year ago. The problem occurs constantly. The problem has been gradually improving. Pertinent negatives include no chest pain. Review of Systems   Constitutional:  Negative for chills and fatigue. Respiratory:  Positive for cough and shortness of breath. Cardiovascular:  Negative for chest pain and leg swelling. Gastrointestinal:  Negative for diarrhea, nausea and vomiting. Objective   Physical Exam  Vitals and nursing note reviewed. Constitutional:       General: He is not in acute distress. Appearance: Normal appearance. HENT:      Head: Normocephalic and atraumatic. Nose: Nose normal.      Mouth/Throat:      Pharynx: Oropharynx is clear. Eyes:      Extraocular Movements: Extraocular movements intact. Conjunctiva/sclera: Conjunctivae normal.   Cardiovascular:      Rate and Rhythm: Normal rate and regular rhythm. Pulses: Normal pulses. Heart sounds: Normal heart sounds. Pulmonary:      Effort: Pulmonary effort is normal. No respiratory distress. Breath sounds: Normal breath sounds. No wheezing, rhonchi or rales. Comments: O2 in place  Musculoskeletal:         General: No swelling or tenderness. Normal range of motion. Cervical back: Normal range of motion and neck supple. Skin:     General: Skin is warm and dry. Neurological:      General: No focal deficit present. Mental Status: He is alert. Mental status is at baseline.    Psychiatric:         Mood and Affect: Mood normal.         Behavior: Behavior normal.              An electronic signature was used to authenticate this note.     --Reina Quinones MD

## 2022-11-24 LAB
ALBUMIN SERPL-MCNC: 3.7 G/DL (ref 3.2–4.6)
ALBUMIN/GLOB SERPL: 1.3 {RATIO} (ref 0.4–1.6)
ALP SERPL-CCNC: 89 U/L (ref 50–136)
ALT SERPL-CCNC: 20 U/L (ref 12–65)
ANION GAP SERPL CALC-SCNC: 6 MMOL/L (ref 2–11)
AST SERPL-CCNC: 19 U/L (ref 15–37)
BILIRUB SERPL-MCNC: 0.7 MG/DL (ref 0.2–1.1)
BUN SERPL-MCNC: 13 MG/DL (ref 8–23)
CALCIUM SERPL-MCNC: 9.3 MG/DL (ref 8.3–10.4)
CHLORIDE SERPL-SCNC: 102 MMOL/L (ref 101–110)
CHOLEST SERPL-MCNC: 117 MG/DL
CO2 SERPL-SCNC: 32 MMOL/L (ref 21–32)
CREAT SERPL-MCNC: 0.8 MG/DL (ref 0.8–1.5)
GLOBULIN SER CALC-MCNC: 2.9 G/DL (ref 2.8–4.5)
GLUCOSE SERPL-MCNC: 90 MG/DL (ref 65–100)
HDLC SERPL-MCNC: 51 MG/DL (ref 40–60)
HDLC SERPL: 2.3 {RATIO}
LDLC SERPL CALC-MCNC: 52.4 MG/DL
POTASSIUM SERPL-SCNC: 4 MMOL/L (ref 3.5–5.1)
PROT SERPL-MCNC: 6.6 G/DL (ref 6.3–8.2)
SODIUM SERPL-SCNC: 140 MMOL/L (ref 133–143)
TRIGL SERPL-MCNC: 68 MG/DL (ref 35–150)
VLDLC SERPL CALC-MCNC: 13.6 MG/DL (ref 6–23)

## 2022-12-12 DIAGNOSIS — J44.1 COPD WITH ACUTE EXACERBATION (HCC): Primary | ICD-10-CM

## 2022-12-21 ENCOUNTER — OFFICE VISIT (OUTPATIENT)
Dept: PULMONOLOGY | Age: 72
End: 2022-12-21
Payer: MEDICARE

## 2022-12-21 ENCOUNTER — HOSPITAL ENCOUNTER (OUTPATIENT)
Dept: GENERAL RADIOLOGY | Age: 72
Discharge: HOME | End: 2022-12-24
Payer: MEDICARE

## 2022-12-21 VITALS
HEART RATE: 124 BPM | WEIGHT: 120 LBS | DIASTOLIC BLOOD PRESSURE: 88 MMHG | TEMPERATURE: 96.8 F | SYSTOLIC BLOOD PRESSURE: 128 MMHG | BODY MASS INDEX: 17.18 KG/M2 | OXYGEN SATURATION: 100 % | HEIGHT: 70 IN

## 2022-12-21 DIAGNOSIS — R09.02 HYPOXEMIA: ICD-10-CM

## 2022-12-21 DIAGNOSIS — Z12.2 SCREENING FOR LUNG CANCER: ICD-10-CM

## 2022-12-21 DIAGNOSIS — J44.1 COPD WITH ACUTE EXACERBATION (HCC): ICD-10-CM

## 2022-12-21 DIAGNOSIS — Z87.891 HISTORY OF TOBACCO ABUSE: ICD-10-CM

## 2022-12-21 DIAGNOSIS — R93.89 ABNORMAL CHEST CT: ICD-10-CM

## 2022-12-21 DIAGNOSIS — J44.9 CHRONIC OBSTRUCTIVE PULMONARY DISEASE, UNSPECIFIED COPD TYPE (HCC): ICD-10-CM

## 2022-12-21 DIAGNOSIS — J44.1 COPD WITH ACUTE EXACERBATION (HCC): Primary | ICD-10-CM

## 2022-12-21 LAB
EXPIRATORY TIME: NORMAL
FEF 25-75% %PRED-PRE: NORMAL
FEF 25-75% PRED: NORMAL
FEF 25-75%-PRE: NORMAL
FEV1 %PRED-PRE: 19 %
FEV1 PRED: NORMAL
FEV1/FVC %PRED-PRE: NORMAL
FEV1/FVC PRED: NORMAL
FEV1/FVC: 37 %
FEV1: 0.6 L
FVC %PRED-PRE: 37 %
FVC PRED: NORMAL
FVC: 1.61 L
PEF %PRED-PRE: NORMAL
PEF PRED: NORMAL
PEF-PRE: NORMAL

## 2022-12-21 PROCEDURE — 94010 BREATHING CAPACITY TEST: CPT | Performed by: INTERNAL MEDICINE

## 2022-12-21 PROCEDURE — 3074F SYST BP LT 130 MM HG: CPT | Performed by: INTERNAL MEDICINE

## 2022-12-21 PROCEDURE — 3078F DIAST BP <80 MM HG: CPT | Performed by: INTERNAL MEDICINE

## 2022-12-21 PROCEDURE — 71046 X-RAY EXAM CHEST 2 VIEWS: CPT

## 2022-12-21 PROCEDURE — 99205 OFFICE O/P NEW HI 60 MIN: CPT | Performed by: INTERNAL MEDICINE

## 2022-12-21 PROCEDURE — 1123F ACP DISCUSS/DSCN MKR DOCD: CPT | Performed by: INTERNAL MEDICINE

## 2022-12-21 ASSESSMENT — ENCOUNTER SYMPTOMS
SHORTNESS OF BREATH: 1
BACK PAIN: 1
WHEEZING: 1
COUGH: 1

## 2022-12-21 ASSESSMENT — PULMONARY FUNCTION TESTS
FEV1_PERCENT_PREDICTED_PRE: 19
FVC_PERCENT_PREDICTED_PRE: 37
FEV1: 0.60
FEV1/FVC: 37
FVC: 1.61

## 2022-12-21 NOTE — PROGRESS NOTES
[address], [address] [facility]  [phone]    Patient Name:  Carlos Alberto May  YOB: 1950      Office Visit 12/21/2022    CHIEF COMPLAINT:    Chief Complaint   Patient presents with    New Patient    COPD       HISTORY OF PRESENT ILLNESS:      Patient is 71years old white male who is here today referral Dr. Robert Jose for the evaluation of shortness of breath and COPD. Patient reports he was told he has COPD maybe 5 to 6 years ago. He has been on Advair since. He thinks he may have been on some other inhaler as well however he does not recall it. He had baseline shortness of breath with exertion for a long time he was however able to take care of himself however he reports that in September of this year he had COVID he stayed in the hospital for 2 weeks and was discharged on oxygen. He was never on the ventilator. Afterwards he went to rehab for 2 weeks and then home. He reports since COVID he has not been the same and his breathing has been terrible. He has hard time doing activities what he could do before COVID. He does have minimal cough not much sputum production. He denies hemoptysis. He does have wheezing on and off. He does have some difficulties to take care of himself now because he lives by himself and gets short of breath and gasping for air with minimal walking. He continues to use Advair and albuterol which she does not use very often because he feels it does not really help much. He is on 3 L of oxygen now during the day at night. He quit smoking when he was in the hospital in October.     Past Medical History:   Diagnosis Date    Acute bronchitis     CAD (coronary artery disease)     COPD (chronic obstructive pulmonary disease) (HCC)     Eczema     ED (erectile dysfunction)     Edema     Emphysema/COPD (HCC)     Hypercholesterolemia     Hypertension     Joint pain     Leg cramps     Osteoarthritis     Osteoarthritis of cervical spine     Tobacco use disorder     Warts     Weight loss          Patient Active Problem List   Diagnosis    Inflamed seborrheic keratosis    Coronary atherosclerosis of native coronary vessel    Mixed hyperlipidemia    CAD (coronary artery disease)    Viral wart on finger    Viral wart on left thumb    Pre-operative cardiovascular examination    Tobacco abuse    Essential hypertension, benign    S/P coronary artery stent placement    ED (erectile dysfunction)    COPD with acute exacerbation ([facility] [address]    Community acquired pneumonia due to Pneumococcus ([facility] [address]    Acute respiratory failure with hypoxia (HCC)    Pneumonia due to COVID-19 virus    Severe protein-calorie malnutrition ([facility] [address]           Past Surgical History:   Procedure Laterality Date    CARDIAC CATHETERIZATION      3    CATARACT REMOVAL Bilateral     CORONARY ANGIOPLASTY WITH STENT PLACEMENT      multiple stents     HERNIA REPAIR      double-inguinal    ORTHOPEDIC SURGERY      spine surgery (TWICE)    PTCA           Social History     Socioeconomic History    Marital status: Single     Spouse name: Not on file    Number of children: Not on file    Years of education: Not on file    Highest education level: Not on file   Occupational History    Not on file   Tobacco Use    Smoking status: Former     Packs/day: 1.00     Types: Cigarettes    Smokeless tobacco: Former   Vaping Use    Vaping Use: Never used   Substance and Sexual Activity    Alcohol use: Yes    Drug use: No    Sexual activity: Not on file   Other Topics Concern    Not on file   Social History Narrative    Not on file     Social Determinants of Health     Financial Resource Strain: Not on file   Food Insecurity: Not on file   Transportation Needs: Not on file   Physical Activity: Not on file   Stress: Not on file   Social Connections: Not on file   Intimate Partner Violence: Not on file   Housing Stability: Not on file         Family History   Problem Relation Age of Onset    Cancer Mother         liver    No Known Problems Father          No Known Allergies      Current Outpatient Medications   Medication Sig    atorvastatin (LIPITOR) 40 MG tablet Take 1 tablet by mouth at bedtime    atenolol (TENORMIN) 25 MG tablet Take 0.5 tablets by mouth daily    fluticasone-salmeterol (ADVAIR) 250-50 MCG/ACT AEPB diskus inhaler Inhale 1 puff into the lungs in the morning and 1 puff in the evening.    ezetimibe (ZETIA) 10 MG tablet Take 1 tablet by mouth daily    albuterol sulfate  (90 Base) MCG/ACT inhaler Inhale 2 puffs into the lungs every 6 hours as needed    aspirin 81 MG EC tablet Take 81 mg by mouth    nitroGLYCERIN (NITROSTAT) 0.4 MG SL tablet Place 0.4 mg under the tongue     No current facility-administered medications for this visit. REVIEW OF SYSTEMS:     Review of Systems   Constitutional:  Positive for fatigue and unexpected weight change. Respiratory:  Positive for cough, shortness of breath and wheezing. Musculoskeletal:  Positive for arthralgias and back pain. Neurological:  Positive for weakness. Hematological:  Bruises/bleeds easily. All other systems reviewed and are negative. Author Robby PHYSICAL EXAM:    Vitals:    12/21/22 0828   BP: 128/88   Pulse: (!) 124   Temp: 96.8 °F (36 °C)   SpO2: 100%        GENERAL APPEARANCE:   The patient is normal weight and in no respiratory distress. HEENT:   PERRL. Conjunctivae unremarkable. Nasal mucosa is without epistaxis, exudate, or polyps. Gums and dentition are unremarkable. There is no oropharyngeal narrowing. TMs are clear. NECK/LYMPHATIC:   Symmetrical with no elevation of jugular venous pulsation. Trachea midline. No thyroid enlargement. No cervical adenopathy. LUNGS:   Normal respiratory effort with symmetrical lung expansion. Breath sounds distant . HEART:   There is a regular rate and rhythm. No murmur, rub, or gallop. There is no edema in the lower extremities. ABDOMEN:   Soft and non-tender. No hepatosplenomegaly. Bowel sounds are normal.     SKIN:   There are no rashes, cyanosis, jaundice, or ecchymosis present. EXTREMITIES:   The extremities are unremarkable without clubbing, cyanosis, joint inflammation, degenerative, or ischemic change. MUSCULOSKELETAL:   There is no abnormal tone, muscle atrophy, or abnormal movement present. NEURO:   The patient is alert and oriented to person, place, and time. Memory appears intact and mood is normal.  No gross sensorimotor deficits are present. DIAGNOSTIC TESTS:   PCXR: No valid procedures specified. CXR PA and lateral:  No results found for this or any previous visit. Screening chest CT: No results found for this or any previous visit. CT of chest without contrast:   No results found for this or any previous visit. CT of chest with contrast:  No valid procedures specified. PET/CT: No valid procedures specified. Spirometry:  12/21/2022        Office Spirometry Results Latest Ref Rng & Units 12/21/2022   FVC L 1.61   FEV1 L 0.60   FEV1 %PRED-PRE % 19   FVC %PRED-PRE % 37   FEV1/FVC % 37          Exercise oximetry:  Resting Spo2 on 2L 100 % with HR of 62                                              Ambulating Spo2 on 2L 95 % with       ASSESSMENT:  (Medical Decision Making)        Diagnosis Orders   1. COPD with acute exacerbation ([facility] [address]  Spirometry Without Bronchodilator    Spirometry Without Bronchodilator    Ambulatory referral to Pulmonary Rehab    Pulse oximetry, overnight    DME - DURABLE MEDICAL EQUIPMENT      2. Chronic obstructive pulmonary disease, unspecified COPD type ([address]  Very severe COPD, end stage only on Advair   Will try to change to Trelegy  He would benefit from pulmonary rehab and agrees  Severe debility  Will need palliative care       3. Hypoxemia  Chronic on 2L day/night       4. History of tobacco abuse        5. Screening for lung cancer        6.  Abnormal chest CT   Abnormal density when done in 8/2022 , will do follow up CT st next visit              PLAN:    - will start trelegy  - check overnight oximetry on 2L  - needs 2L with exertion   - referral to pulmonary rehab  - portable concentrator  -follow up in 3  months         Orders Placed This Encounter   Procedures    Ambulatory referral to Pulmonary Rehab     Referral Priority:   Routine     Referral Type:   Consult for Advice and Opinion     Referral Reason:   Specialty Services Required     Requested Specialty:   Pulmonology     Number of Visits Requested:   1    Pulse oximetry, overnight     Scheduling Instructions:      Please send out Overnight Oximetry on 2 lpm qhs. Please Fax results to: [phone]    Spirometry Without Bronchodilator     Standing Status:   Future     Number of Occurrences:   1     Standing Expiration Date:   12/21/2023    DME - DURABLE MEDICAL EQUIPMENT     Please evaluate and provide patient for a Portable Concentrator. Patient must maintain above 90%       No orders of the defined types were placed in this encounter. Over 50% of today's office visit was spent in face to face time reviewing test results/records, prognosis, importance of compliance, education about disease process, benefits of medications, instructions for management of acute flare-ups, and follow up plans. Total time spent was 60 minutes. Augustin Hargrove MD  Electronically signed    Dictated using voice recognition software.   Proof read but unrecognized errors may exist.

## 2023-02-06 ENCOUNTER — TELEPHONE (OUTPATIENT)
Dept: SLEEP MEDICINE | Age: 73
End: 2023-02-06

## 2023-02-06 RX ORDER — FLUTICASONE FUROATE, UMECLIDINIUM BROMIDE AND VILANTEROL TRIFENATATE 100; 62.5; 25 UG/1; UG/1; UG/1
1 POWDER RESPIRATORY (INHALATION) DAILY
Qty: 1 EACH | Refills: 5 | Status: SHIPPED | OUTPATIENT
Start: 2023-02-06

## 2023-02-06 NOTE — TELEPHONE ENCOUNTER
Patient Refill Request     Last Office Visit: 12/21/22 - Jelani Moses     When they were supposed to follow up:  3  months     Upcoming Office Visit: 03/15/23 - CHARLES     Refills Requested: Patient asked for ADVAIR to be switched to Catherine Broach.  Please advise     Requested Pharmacy: [facility]

## 2023-02-24 ENCOUNTER — OFFICE VISIT (OUTPATIENT)
Dept: FAMILY MEDICINE CLINIC | Facility: CLINIC | Age: 73
End: 2023-02-24
Payer: MEDICARE

## 2023-02-24 VITALS
WEIGHT: 120 LBS | TEMPERATURE: 97 F | SYSTOLIC BLOOD PRESSURE: 116 MMHG | OXYGEN SATURATION: 96 % | HEART RATE: 79 BPM | RESPIRATION RATE: 18 BRPM | BODY MASS INDEX: 17.18 KG/M2 | HEIGHT: 70 IN | DIASTOLIC BLOOD PRESSURE: 70 MMHG

## 2023-02-24 DIAGNOSIS — I10 ESSENTIAL HYPERTENSION, BENIGN: Primary | ICD-10-CM

## 2023-02-24 DIAGNOSIS — Z12.11 COLON CANCER SCREENING: ICD-10-CM

## 2023-02-24 DIAGNOSIS — E43 SEVERE PROTEIN-CALORIE MALNUTRITION (HCC): Chronic | ICD-10-CM

## 2023-02-24 DIAGNOSIS — I25.10 ATHEROSCLEROSIS OF NATIVE CORONARY ARTERY OF NATIVE HEART WITHOUT ANGINA PECTORIS: ICD-10-CM

## 2023-02-24 DIAGNOSIS — E78.2 MIXED HYPERLIPIDEMIA: ICD-10-CM

## 2023-02-24 LAB
ALBUMIN SERPL-MCNC: 3.8 G/DL (ref 3.2–4.6)
ALBUMIN/GLOB SERPL: 1.2 (ref 0.4–1.6)
ALP SERPL-CCNC: 88 U/L (ref 50–136)
ALT SERPL-CCNC: 27 U/L (ref 12–65)
ANION GAP SERPL CALC-SCNC: 0 MMOL/L (ref 2–11)
AST SERPL-CCNC: 20 U/L (ref 15–37)
BASOPHILS # BLD: 0 K/UL (ref 0–0.2)
BASOPHILS NFR BLD: 1 % (ref 0–2)
BILIRUB SERPL-MCNC: 0.7 MG/DL (ref 0.2–1.1)
BUN SERPL-MCNC: 12 MG/DL (ref 8–23)
CALCIUM SERPL-MCNC: 9.3 MG/DL (ref 8.3–10.4)
CHLORIDE SERPL-SCNC: 104 MMOL/L (ref 101–110)
CHOLEST SERPL-MCNC: 119 MG/DL
CO2 SERPL-SCNC: 35 MMOL/L (ref 21–32)
CREAT SERPL-MCNC: 0.9 MG/DL (ref 0.8–1.5)
DIFFERENTIAL METHOD BLD: ABNORMAL
EOSINOPHIL # BLD: 0.2 K/UL (ref 0–0.8)
EOSINOPHIL NFR BLD: 2 % (ref 0.5–7.8)
ERYTHROCYTE [DISTWIDTH] IN BLOOD BY AUTOMATED COUNT: 15 % (ref 11.9–14.6)
GLOBULIN SER CALC-MCNC: 3.1 G/DL (ref 2.8–4.5)
GLUCOSE SERPL-MCNC: 100 MG/DL (ref 65–100)
HCT VFR BLD AUTO: 43.2 % (ref 41.1–50.3)
HDLC SERPL-MCNC: 49 MG/DL (ref 40–60)
HDLC SERPL: 2.4
HGB BLD-MCNC: 13.7 G/DL (ref 13.6–17.2)
IMM GRANULOCYTES # BLD AUTO: 0 K/UL (ref 0–0.5)
IMM GRANULOCYTES NFR BLD AUTO: 0 % (ref 0–5)
LDLC SERPL CALC-MCNC: 56.8 MG/DL
LYMPHOCYTES # BLD: 1.5 K/UL (ref 0.5–4.6)
LYMPHOCYTES NFR BLD: 18 % (ref 13–44)
MCH RBC QN AUTO: 29.6 PG (ref 26.1–32.9)
MCHC RBC AUTO-ENTMCNC: 31.7 G/DL (ref 31.4–35)
MCV RBC AUTO: 93.3 FL (ref 82–102)
MONOCYTES # BLD: 0.6 K/UL (ref 0.1–1.3)
MONOCYTES NFR BLD: 8 % (ref 4–12)
NEUTS SEG # BLD: 5.7 K/UL (ref 1.7–8.2)
NEUTS SEG NFR BLD: 71 % (ref 43–78)
NRBC # BLD: 0 K/UL (ref 0–0.2)
PLATELET # BLD AUTO: 207 K/UL (ref 150–450)
PMV BLD AUTO: 10.3 FL (ref 9.4–12.3)
POTASSIUM SERPL-SCNC: 4.2 MMOL/L (ref 3.5–5.1)
PROT SERPL-MCNC: 6.9 G/DL (ref 6.3–8.2)
RBC # BLD AUTO: 4.63 M/UL (ref 4.23–5.6)
SODIUM SERPL-SCNC: 139 MMOL/L (ref 133–143)
TRIGL SERPL-MCNC: 66 MG/DL (ref 35–150)
VLDLC SERPL CALC-MCNC: 13.2 MG/DL (ref 6–23)
WBC # BLD AUTO: 7.9 K/UL (ref 4.3–11.1)

## 2023-02-24 PROCEDURE — 1123F ACP DISCUSS/DSCN MKR DOCD: CPT | Performed by: FAMILY MEDICINE

## 2023-02-24 PROCEDURE — 99214 OFFICE O/P EST MOD 30 MIN: CPT | Performed by: FAMILY MEDICINE

## 2023-02-24 PROCEDURE — 3074F SYST BP LT 130 MM HG: CPT | Performed by: FAMILY MEDICINE

## 2023-02-24 PROCEDURE — 3078F DIAST BP <80 MM HG: CPT | Performed by: FAMILY MEDICINE

## 2023-02-24 RX ORDER — EZETIMIBE 10 MG/1
10 TABLET ORAL DAILY
Qty: 90 TABLET | Refills: 3 | Status: SHIPPED | OUTPATIENT
Start: 2023-02-24

## 2023-02-24 RX ORDER — ATORVASTATIN CALCIUM 40 MG/1
40 TABLET, FILM COATED ORAL NIGHTLY
Qty: 90 TABLET | Refills: 3 | Status: SHIPPED | OUTPATIENT
Start: 2023-02-24

## 2023-02-24 SDOH — ECONOMIC STABILITY: INCOME INSECURITY: HOW HARD IS IT FOR YOU TO PAY FOR THE VERY BASICS LIKE FOOD, HOUSING, MEDICAL CARE, AND HEATING?: NOT VERY HARD

## 2023-02-24 SDOH — ECONOMIC STABILITY: FOOD INSECURITY: WITHIN THE PAST 12 MONTHS, YOU WORRIED THAT YOUR FOOD WOULD RUN OUT BEFORE YOU GOT MONEY TO BUY MORE.: NEVER TRUE

## 2023-02-24 SDOH — ECONOMIC STABILITY: FOOD INSECURITY: WITHIN THE PAST 12 MONTHS, THE FOOD YOU BOUGHT JUST DIDN'T LAST AND YOU DIDN'T HAVE MONEY TO GET MORE.: NEVER TRUE

## 2023-02-24 SDOH — ECONOMIC STABILITY: HOUSING INSECURITY
IN THE LAST 12 MONTHS, WAS THERE A TIME WHEN YOU DID NOT HAVE A STEADY PLACE TO SLEEP OR SLEPT IN A SHELTER (INCLUDING NOW)?: NO

## 2023-02-24 ASSESSMENT — ENCOUNTER SYMPTOMS
SHORTNESS OF BREATH: 1
NAUSEA: 0
WHEEZING: 1
COUGH: 1
DIARRHEA: 0
VOMITING: 0
CHEST TIGHTNESS: 1

## 2023-02-24 ASSESSMENT — PATIENT HEALTH QUESTIONNAIRE - PHQ9
1. LITTLE INTEREST OR PLEASURE IN DOING THINGS: 0
SUM OF ALL RESPONSES TO PHQ QUESTIONS 1-9: 0
2. FEELING DOWN, DEPRESSED OR HOPELESS: 0
SUM OF ALL RESPONSES TO PHQ9 QUESTIONS 1 & 2: 0
SUM OF ALL RESPONSES TO PHQ QUESTIONS 1-9: 0

## 2023-02-24 ASSESSMENT — COPD QUESTIONNAIRES: COPD: 1

## 2023-02-24 NOTE — PROGRESS NOTES
Jeremias Casanova (:  1950) is a 71 y.o. male,Established patient, here for evaluation of the following chief complaint(s):  Follow-up (Chronic care follow up. Fasting. ), Hypertension, and Cholesterol Problem         ASSESSMENT/PLAN:  1. Essential hypertension, benign- wellcontrolled  -     CBC with Auto Differential; Future  -     Comprehensive Metabolic Panel; Future  2. Mixed hyperlipidemia- repeat fasting labs  -     Comprehensive Metabolic Panel; Future  -     Lipid Panel; Future  -     ezetimibe (ZETIA) 10 MG tablet; Take 1 tablet by mouth daily, Disp-90 tablet, R-3Normal  -     atorvastatin (LIPITOR) 40 MG tablet; Take 1 tablet by mouth at bedtime, Disp-90 tablet, R-3Normal  3. Colon cancer screening  -     POCT FECAL IMMUNOCHEMICAL TEST (FIT) ()  4. Atherosclerosis of native coronary artery of native heart without angina pectoris  Assessment & Plan:   Borderline controlled, continue current medications  5. Severe protein-calorie malnutrition ([facility]  Assessment & Plan:   Borderline controlled, continue current medications  Orders:  -     CBC with Auto Differential; Future  -     Comprehensive Metabolic Panel; Future      Return in about 3 months (around 2023) for fasting chronic care. Subjective   SUBJECTIVE/OBJECTIVE:  Hypertension  This is a chronic problem. The current episode started more than 1 year ago. The problem is unchanged. The problem is controlled. Associated symptoms include shortness of breath. Pertinent negatives include no chest pain. Hyperlipidemia  This is a chronic problem. The current episode started more than 1 year ago. The problem is controlled. Recent lipid tests were reviewed and are variable. Associated symptoms include shortness of breath. Pertinent negatives include no chest pain. COPD  He complains of chest tightness, cough, shortness of breath and wheezing. Pertinent negatives include no chest pain.      Review of Systems   Constitutional:  Negative for chills and fatigue. Respiratory:  Positive for cough, shortness of breath and wheezing. Cardiovascular:  Negative for chest pain and leg swelling. Gastrointestinal:  Negative for diarrhea, nausea and vomiting. Objective   Physical Exam  Vitals and nursing note reviewed. Constitutional:       General: He is not in acute distress. Appearance: Normal appearance. HENT:      Head: Normocephalic and atraumatic. Nose: Nose normal.      Mouth/Throat:      Pharynx: Oropharynx is clear. Eyes:      Extraocular Movements: Extraocular movements intact. Conjunctiva/sclera: Conjunctivae normal.   Cardiovascular:      Rate and Rhythm: Normal rate and regular rhythm. Pulses: Normal pulses. Heart sounds: Normal heart sounds. Pulmonary:      Effort: Pulmonary effort is normal.      Breath sounds: Normal breath sounds. Musculoskeletal:         General: No swelling or tenderness. Normal range of motion. Cervical back: Normal range of motion and neck supple. Skin:     General: Skin is warm and dry. Neurological:      General: No focal deficit present. Mental Status: He is alert. Mental status is at baseline. Psychiatric:         Mood and Affect: Mood normal.         Behavior: Behavior normal.              An electronic signature was used to authenticate this note.     --Patrick Antunez MD

## 2023-03-15 ENCOUNTER — OFFICE VISIT (OUTPATIENT)
Dept: PULMONOLOGY | Age: 73
End: 2023-03-15
Payer: MEDICARE

## 2023-03-15 VITALS
WEIGHT: 126 LBS | RESPIRATION RATE: 14 BRPM | OXYGEN SATURATION: 92 % | SYSTOLIC BLOOD PRESSURE: 111 MMHG | DIASTOLIC BLOOD PRESSURE: 79 MMHG | BODY MASS INDEX: 18.04 KG/M2 | HEIGHT: 70 IN | HEART RATE: 97 BPM | TEMPERATURE: 96.9 F

## 2023-03-15 DIAGNOSIS — Z12.2 ENCOUNTER FOR SCREENING FOR LUNG CANCER: ICD-10-CM

## 2023-03-15 DIAGNOSIS — R09.02 HYPOXEMIA: ICD-10-CM

## 2023-03-15 DIAGNOSIS — J44.9 CHRONIC OBSTRUCTIVE PULMONARY DISEASE, UNSPECIFIED COPD TYPE (HCC): Primary | ICD-10-CM

## 2023-03-15 DIAGNOSIS — R91.1 LUNG NODULE: ICD-10-CM

## 2023-03-15 PROCEDURE — 99214 OFFICE O/P EST MOD 30 MIN: CPT | Performed by: INTERNAL MEDICINE

## 2023-03-15 PROCEDURE — 1123F ACP DISCUSS/DSCN MKR DOCD: CPT | Performed by: INTERNAL MEDICINE

## 2023-03-15 PROCEDURE — 3074F SYST BP LT 130 MM HG: CPT | Performed by: INTERNAL MEDICINE

## 2023-03-15 PROCEDURE — 3078F DIAST BP <80 MM HG: CPT | Performed by: INTERNAL MEDICINE

## 2023-03-15 ASSESSMENT — ENCOUNTER SYMPTOMS
SHORTNESS OF BREATH: 1
BACK PAIN: 1
WHEEZING: 1
COUGH: 1

## 2023-03-15 NOTE — PROGRESS NOTES
[address] Bryson Royer. [address], [address] [facility]  [phone]    Patient Name:  Nestor Berger  YOB: 1950      Office Visit 3/15/2023    CHIEF COMPLAINT:    Chief Complaint   Patient presents with    Follow-up    COPD       HISTORY OF PRESENT ILLNESS:      Patient is 71years old white male who is here today for follow-up of COPD. Patient was last seen in December 2022. Today he reports his breathing is better. He feels it is because he is walking more. He is on Trelegy now which he says he cannot tell much difference he was on Advair before. He also has rescue albuterol which he does not use very often. He does have occasional wheezing. He does have occasional cough with white sputum production. He denies hemoptysis. He is on oxygen 2 L with exertion and at night.         Past Medical History:   Diagnosis Date    Acute bronchitis     CAD (coronary artery disease)     COPD (chronic obstructive pulmonary disease) (HCC)     Eczema     ED (erectile dysfunction)     Edema     Emphysema/COPD (HCC)     Hypercholesterolemia     Hypertension     Joint pain     Leg cramps     Osteoarthritis     Osteoarthritis of cervical spine     Tobacco use disorder     Warts     Weight loss          Patient Active Problem List   Diagnosis    Inflamed seborrheic keratosis    Coronary atherosclerosis of native coronary vessel    Mixed hyperlipidemia    CAD (coronary artery disease)    Viral wart on finger    Viral wart on left thumb    Pre-operative cardiovascular examination    Tobacco abuse    Essential hypertension, benign    S/P coronary artery stent placement    ED (erectile dysfunction)    Other emphysema ([address]    Community acquired pneumonia due to Pneumococcus ([address]    Acute respiratory failure with hypoxia ([address]    Pneumonia due to COVID-19 virus    Severe protein-calorie malnutrition ([address]           Past Surgical History:   Procedure Laterality Date    CARDIAC CATHETERIZATION      3    CATARACT REMOVAL Bilateral     CORONARY ANGIOPLASTY WITH STENT PLACEMENT      multiple stents     HERNIA REPAIR      double-inguinal    ORTHOPEDIC SURGERY      spine surgery (TWICE)    PTCA           Social History     Socioeconomic History    Marital status: Single     Spouse name: Not on file    Number of children: Not on file    Years of education: Not on file    Highest education level: Not on file   Occupational History    Not on file   Tobacco Use    Smoking status: Former     Packs/day: 1.00     Years: 57.00     Pack years: 57.00     Types: Cigarettes     Quit date: 2022     Years since quittin.5    Smokeless tobacco: Former   Vaping Use    Vaping Use: Never used   Substance and Sexual Activity    Alcohol use: Yes    Drug use: No    Sexual activity: Not on file   Other Topics Concern    Not on file   Social History Narrative    Not on file     Social Determinants of Health     Financial Resource Strain: Low Risk     Difficulty of Paying Living Expenses: Not very hard   Food Insecurity: No Food Insecurity    Worried About Running Out of Food in the Last Year: Never true    [address] in the Last Year: Never true   Transportation Needs: Unknown    Lack of Transportation (Medical): Not on file    Lack of Transportation (Non-Medical):  No   Physical Activity: Not on file   Stress: Not on file   Social Connections: Not on file   Intimate Partner Violence: Not on file   Housing Stability: Unknown    Unable to Pay for Housing in the Last Year: Not on file    Number of Places Lived in the Last Year: Not on file    Unstable Housing in the Last Year: No         Family History   Problem Relation Age of Onset    Cancer Mother         liver    No Known Problems Father          No Known Allergies      Current Outpatient Medications   Medication Sig    OXYGEN Inhale into the lungs 2LPM    ezetimibe (ZETIA) 10 MG tablet Take 1 tablet by mouth daily    atorvastatin (LIPITOR) 40 MG tablet Take 1 tablet by mouth at bedtime fluticasone-umeclidin-vilant (TRELEGY ELLIPTA) 100-62.5-25 MCG/ACT AEPB inhaler Inhale 1 puff into the lungs daily    atenolol (TENORMIN) 25 MG tablet Take 0.5 tablets by mouth daily    albuterol sulfate  (90 Base) MCG/ACT inhaler Inhale 2 puffs into the lungs every 6 hours as needed    aspirin 81 MG EC tablet Take 81 mg by mouth    nitroGLYCERIN (NITROSTAT) 0.4 MG SL tablet Place 0.4 mg under the tongue     No current facility-administered medications for this visit. REVIEW OF SYSTEMS:     Review of Systems   Constitutional:  Positive for fatigue and unexpected weight change. Respiratory:  Positive for cough, shortness of breath and wheezing. Musculoskeletal:  Positive for arthralgias and back pain. Neurological:  Positive for weakness. Hematological:  Bruises/bleeds easily. All other systems reviewed and are negative. [facility] PHYSICAL EXAM:    Vitals:    03/15/23 1024   BP: 111/79   Pulse: 97   Resp: 14   Temp: 96.9 °F (36.1 °C)   SpO2: 92%        GENERAL APPEARANCE:   The patient is normal weight and in no respiratory distress. HEENT:   PERRL. Conjunctivae unremarkable. Nasal mucosa is without epistaxis, exudate, or polyps. Gums and dentition are unremarkable. There is no oropharyngeal narrowing. TMs are clear. NECK/LYMPHATIC:   Symmetrical with no elevation of jugular venous pulsation. Trachea midline. No thyroid enlargement. No cervical adenopathy. LUNGS:   Normal respiratory effort with symmetrical lung expansion. Breath sounds distant . HEART:   There is a regular rate and rhythm. No murmur, rub, or gallop. There is no edema in the lower extremities. ABDOMEN:   Soft and non-tender. No hepatosplenomegaly. Bowel sounds are normal.     SKIN:   There are no rashes, cyanosis, jaundice, or ecchymosis present.    EXTREMITIES:   The extremities are unremarkable without clubbing, cyanosis, joint inflammation, degenerative, or ischemic change. MUSCULOSKELETAL:   There is no abnormal tone, muscle atrophy, or abnormal movement present. NEURO:   The patient is alert and oriented to person, place, and time. Memory appears intact and mood is normal.  No gross sensorimotor deficits are present. DIAGNOSTIC TESTS:   PCXR: No valid procedures specified. CXR PA and lateral:  No results found for this or any previous visit. Screening chest CT: No results found for this or any previous visit. CT of chest without contrast:   No results found for this or any previous visit. CT of chest with contrast:  No valid procedures specified. PET/CT: No valid procedures specified. Spirometry:  12/21/2022        Office Spirometry Results Latest Ref Rng & Units 12/21/2022   FVC L 1.61   FEV1 L 0.60   FEV1 %PRED-PRE % 19   FVC %PRED-PRE % 37   FEV1/FVC % 37         ASSESSMENT:  (Medical Decision Making)        Diagnosis Orders   1. COPD with acute exacerbation ([facility]  Spirometry Without Bronchodilator    Spirometry Without Bronchodilator    Ambulatory referral to Pulmonary Rehab    Pulse oximetry, overnight    DME - DURABLE MEDICAL EQUIPMENT      2. Chronic obstructive pulmonary disease, unspecified COPD type ([facility]  Very severe COPD  On trelegy, working well      3. Hypoxemia  Chronic on 2L day/night   -will need overnight oximetry on 2L      4. History of tobacco abuse        5. Screening for lung cancer        6.  Abnormal chest CT   Abnormal density when done in 8/2022 ,needs follow up Ct scan              PLAN:    - will start trelegy  - check overnight oximetry on 2L  - needs 2L with exertion   - referral to pulmonary rehab- to jitendra   - portable concentrator  -follow up in 3  months         Orders Placed This Encounter   Procedures    Ambulatory referral to Pulmonary Rehab     Referral Priority:   Routine     Referral Type:   Consult for Advice and Opinion     Referral Reason:   Specialty Services Required     Requested Specialty:   Pulmonology     Number of Visits Requested:   1    Pulse oximetry, overnight     Scheduling Instructions:      Please send out Overnight Oximetry on 2 lpm qhs. Please Fax results to: [phone]    [address]     Please evaluate and provide patient for a Portable Concentrator. Patient must maintain above 90%       No orders of the defined types were placed in this encounter. Over 50% of today's office visit was spent in face to face time reviewing test results/records, prognosis, importance of compliance, education about disease process, benefits of medications, instructions for management of acute flare-ups, and follow up plans. Total time spent was 60 minutes. Dorethia Severe, MD  Electronically signed    Dictated using voice recognition software.   Proof read but unrecognized errors may exist.

## 2023-06-02 ENCOUNTER — OFFICE VISIT (OUTPATIENT)
Dept: FAMILY MEDICINE CLINIC | Facility: CLINIC | Age: 73
End: 2023-06-02
Payer: MEDICARE

## 2023-06-02 VITALS
TEMPERATURE: 97.5 F | HEIGHT: 70 IN | SYSTOLIC BLOOD PRESSURE: 118 MMHG | RESPIRATION RATE: 18 BRPM | OXYGEN SATURATION: 97 % | DIASTOLIC BLOOD PRESSURE: 76 MMHG | BODY MASS INDEX: 16.46 KG/M2 | WEIGHT: 115 LBS | HEART RATE: 84 BPM

## 2023-06-02 DIAGNOSIS — E78.2 MIXED HYPERLIPIDEMIA: ICD-10-CM

## 2023-06-02 DIAGNOSIS — I10 ESSENTIAL HYPERTENSION, BENIGN: Primary | ICD-10-CM

## 2023-06-02 DIAGNOSIS — I10 ESSENTIAL HYPERTENSION, BENIGN: ICD-10-CM

## 2023-06-02 DIAGNOSIS — E43 SEVERE PROTEIN-CALORIE MALNUTRITION (HCC): Chronic | ICD-10-CM

## 2023-06-02 DIAGNOSIS — J44.9 CHRONIC OBSTRUCTIVE PULMONARY DISEASE, UNSPECIFIED COPD TYPE (HCC): ICD-10-CM

## 2023-06-02 DIAGNOSIS — J96.11 CHRONIC RESPIRATORY FAILURE WITH HYPOXIA (HCC): ICD-10-CM

## 2023-06-02 PROBLEM — J13 COMMUNITY ACQUIRED PNEUMONIA DUE TO PNEUMOCOCCUS (HCC): Status: RESOLVED | Noted: 2022-09-04 | Resolved: 2023-06-02

## 2023-06-02 LAB
ALBUMIN SERPL-MCNC: 4 G/DL (ref 3.2–4.6)
ALBUMIN/GLOB SERPL: 1.2 (ref 0.4–1.6)
ALP SERPL-CCNC: 82 U/L (ref 50–136)
ALT SERPL-CCNC: 25 U/L (ref 12–65)
ANION GAP SERPL CALC-SCNC: 1 MMOL/L (ref 2–11)
AST SERPL-CCNC: 22 U/L (ref 15–37)
BASOPHILS # BLD: 0 K/UL (ref 0–0.2)
BASOPHILS NFR BLD: 0 % (ref 0–2)
BILIRUB SERPL-MCNC: 0.8 MG/DL (ref 0.2–1.1)
BUN SERPL-MCNC: 17 MG/DL (ref 8–23)
CALCIUM SERPL-MCNC: 9.4 MG/DL (ref 8.3–10.4)
CHLORIDE SERPL-SCNC: 101 MMOL/L (ref 101–110)
CHOLEST SERPL-MCNC: 133 MG/DL
CO2 SERPL-SCNC: 37 MMOL/L (ref 21–32)
CREAT SERPL-MCNC: 0.9 MG/DL (ref 0.8–1.5)
DIFFERENTIAL METHOD BLD: ABNORMAL
EOSINOPHIL # BLD: 0.2 K/UL (ref 0–0.8)
EOSINOPHIL NFR BLD: 2 % (ref 0.5–7.8)
ERYTHROCYTE [DISTWIDTH] IN BLOOD BY AUTOMATED COUNT: 14.2 % (ref 11.9–14.6)
GLOBULIN SER CALC-MCNC: 3.4 G/DL (ref 2.8–4.5)
GLUCOSE SERPL-MCNC: 108 MG/DL (ref 65–100)
HCT VFR BLD AUTO: 44.4 % (ref 41.1–50.3)
HDLC SERPL-MCNC: 55 MG/DL (ref 40–60)
HDLC SERPL: 2.4
HGB BLD-MCNC: 14.1 G/DL (ref 13.6–17.2)
IMM GRANULOCYTES # BLD AUTO: 0 K/UL (ref 0–0.5)
IMM GRANULOCYTES NFR BLD AUTO: 0 % (ref 0–5)
LDLC SERPL CALC-MCNC: 64.4 MG/DL
LYMPHOCYTES # BLD: 1.7 K/UL (ref 0.5–4.6)
LYMPHOCYTES NFR BLD: 18 % (ref 13–44)
MCH RBC QN AUTO: 30.4 PG (ref 26.1–32.9)
MCHC RBC AUTO-ENTMCNC: 31.8 G/DL (ref 31.4–35)
MCV RBC AUTO: 95.7 FL (ref 82–102)
MONOCYTES # BLD: 0.6 K/UL (ref 0.1–1.3)
MONOCYTES NFR BLD: 7 % (ref 4–12)
NEUTS SEG # BLD: 7.2 K/UL (ref 1.7–8.2)
NEUTS SEG NFR BLD: 73 % (ref 43–78)
NRBC # BLD: 0 K/UL (ref 0–0.2)
PLATELET # BLD AUTO: 148 K/UL (ref 150–450)
PMV BLD AUTO: 10.6 FL (ref 9.4–12.3)
POTASSIUM SERPL-SCNC: 4.4 MMOL/L (ref 3.5–5.1)
PROT SERPL-MCNC: 7.4 G/DL (ref 6.3–8.2)
RBC # BLD AUTO: 4.64 M/UL (ref 4.23–5.6)
SODIUM SERPL-SCNC: 139 MMOL/L (ref 133–143)
TRIGL SERPL-MCNC: 68 MG/DL (ref 35–150)
VLDLC SERPL CALC-MCNC: 13.6 MG/DL (ref 6–23)
WBC # BLD AUTO: 9.8 K/UL (ref 4.3–11.1)

## 2023-06-02 PROCEDURE — 99214 OFFICE O/P EST MOD 30 MIN: CPT | Performed by: FAMILY MEDICINE

## 2023-06-02 PROCEDURE — 3074F SYST BP LT 130 MM HG: CPT | Performed by: FAMILY MEDICINE

## 2023-06-02 PROCEDURE — 3078F DIAST BP <80 MM HG: CPT | Performed by: FAMILY MEDICINE

## 2023-06-02 PROCEDURE — 1123F ACP DISCUSS/DSCN MKR DOCD: CPT | Performed by: FAMILY MEDICINE

## 2023-06-02 RX ORDER — FLUTICASONE FUROATE, UMECLIDINIUM BROMIDE AND VILANTEROL TRIFENATATE 100; 62.5; 25 UG/1; UG/1; UG/1
1 POWDER RESPIRATORY (INHALATION) DAILY
Qty: 1 EACH | Refills: 5 | Status: SHIPPED | OUTPATIENT
Start: 2023-06-02

## 2023-06-02 ASSESSMENT — PATIENT HEALTH QUESTIONNAIRE - PHQ9
1. LITTLE INTEREST OR PLEASURE IN DOING THINGS: 0
SUM OF ALL RESPONSES TO PHQ QUESTIONS 1-9: 0
SUM OF ALL RESPONSES TO PHQ9 QUESTIONS 1 & 2: 0
SUM OF ALL RESPONSES TO PHQ QUESTIONS 1-9: 0
2. FEELING DOWN, DEPRESSED OR HOPELESS: 0

## 2023-06-02 ASSESSMENT — ENCOUNTER SYMPTOMS
WHEEZING: 1
COUGH: 0
SHORTNESS OF BREATH: 0
NAUSEA: 0
VOMITING: 0
DIARRHEA: 0
CHEST TIGHTNESS: 1

## 2023-06-02 ASSESSMENT — COPD QUESTIONNAIRES: COPD: 1

## 2023-06-02 NOTE — PROGRESS NOTES
Kris Walton (:  1950) is a 71 y.o. male,Established patient, here for evaluation of the following chief complaint(s):  Follow-up (Chronic care follow up. Fasting. ), Hypertension, and Cholesterol Problem         ASSESSMENT/PLAN:  1. Essential hypertension, benign- well-controlled  -     CBC with Auto Differential; Future  -     Comprehensive Metabolic Panel; Future  2. Mixed hyperlipidemia- repeat fasting labs  -     Comprehensive Metabolic Panel; Future  -     Lipid Panel; Future  3. Chronic obstructive pulmonary disease, unspecified COPD type ([facility]  Assessment & Plan:   Borderline controlled, continue current medications  Orders:  -     fluticasone-umeclidin-vilant (Wilbert Nicole) 100-62.5-25 MCG/ACT AEPB inhaler; Inhale 1 puff into the lungs daily, Disp-1 each, R-5Normal  4. Chronic respiratory failure with hypoxia (HCC)  Assessment & Plan:   Monitored by specialist- no acute findings meriting change in the plan  5. Severe protein-calorie malnutrition ([facility]- will get Meals on Wheels forms sent in for him  Assessment & Plan:   Borderline controlled, continue current medications  6. Adult BMI <19 kg/sq m- BMI handout      Return in about 3 months (around 2023) for fasting chronic care. Subjective   SUBJECTIVE/OBJECTIVE:  Hypertension  This is a chronic problem. The current episode started more than 1 year ago. The problem is unchanged. The problem is controlled. Pertinent negatives include no chest pain or shortness of breath. Hyperlipidemia  This is a chronic problem. The current episode started more than 1 year ago. The problem is controlled. Recent lipid tests were reviewed and are variable. Pertinent negatives include no chest pain or shortness of breath. COPD  He complains of chest tightness and wheezing. There is no cough or shortness of breath. This is a chronic problem. The current episode started more than 1 year ago. The problem occurs constantly.  Pertinent negatives

## 2023-07-05 ENCOUNTER — TELEMEDICINE (OUTPATIENT)
Dept: FAMILY MEDICINE CLINIC | Facility: CLINIC | Age: 73
End: 2023-07-05
Payer: MEDICARE

## 2023-07-05 DIAGNOSIS — Z00.00 MEDICARE ANNUAL WELLNESS VISIT, SUBSEQUENT: Primary | ICD-10-CM

## 2023-07-05 DIAGNOSIS — Z87.891 PERSONAL HISTORY OF TOBACCO USE: ICD-10-CM

## 2023-07-05 DIAGNOSIS — Z23 NEED FOR PROPHYLACTIC VACCINATION AGAINST DIPHTHERIA-TETANUS-PERTUSSIS (DTP): ICD-10-CM

## 2023-07-05 DIAGNOSIS — Z23 NEED FOR PROPHYLACTIC VACCINATION AND INOCULATION AGAINST VARICELLA: ICD-10-CM

## 2023-07-05 PROCEDURE — G0439 PPPS, SUBSEQ VISIT: HCPCS | Performed by: FAMILY MEDICINE

## 2023-07-05 PROCEDURE — 1123F ACP DISCUSS/DSCN MKR DOCD: CPT | Performed by: FAMILY MEDICINE

## 2023-07-05 ASSESSMENT — PATIENT HEALTH QUESTIONNAIRE - PHQ9
SUM OF ALL RESPONSES TO PHQ QUESTIONS 1-9: 1
1. LITTLE INTEREST OR PLEASURE IN DOING THINGS: 1
SUM OF ALL RESPONSES TO PHQ QUESTIONS 1-9: 1
SUM OF ALL RESPONSES TO PHQ9 QUESTIONS 1 & 2: 1
SUM OF ALL RESPONSES TO PHQ QUESTIONS 1-9: 1
SUM OF ALL RESPONSES TO PHQ QUESTIONS 1-9: 1
2. FEELING DOWN, DEPRESSED OR HOPELESS: 0

## 2023-07-05 ASSESSMENT — LIFESTYLE VARIABLES
HOW OFTEN DO YOU HAVE A DRINK CONTAINING ALCOHOL: MONTHLY OR LESS
HOW MANY STANDARD DRINKS CONTAINING ALCOHOL DO YOU HAVE ON A TYPICAL DAY: 1 OR 2

## 2023-07-05 NOTE — PROGRESS NOTES
Rj Rangel (:  1950) is a 71 y.o. male,Established patient, here for evaluation of the following chief complaint(s):  Medicare AWV- no changes, feels ok         ASSESSMENT/PLAN:  1. Medicare annual wellness visit, subsequent  2. Body mass index (BMI) 19.9 or less, adult      BMI handout  3. Personal history of tobacco use  -     CT Lung Screening; Future- on or after 23  4. Need for prophylactic vaccination against diphtheria-tetanus-pertussis (DTP)  -     Tdap (ADACEL) 5-2-15.5 LF-MCG/0.5 injection; Inject 0.5 mLs into the muscle once for 1 dose, Disp-0.5 mL, R-0Normal  5. Need for prophylactic vaccination and inoculation against varicella  -     zoster recombinant adjuvanted vaccine [facility]) 50 MCG/0.5ML SUSR injection; Inject 0.5 mLs into the muscle once for 1 dose, Disp-0.5 mL, R-1Normal    FU here as scheduled         Subjective   SUBJECTIVE/OBJECTIVE:          An electronic signature was used to authenticate this note. --Tyree Rome MD Medicare Annual Wellness Visit    Rj Rangel is here for Medicare AWV    Assessment & Plan   Medicare annual wellness visit, subsequent  Body mass index (BMI) 19.9 or less, adult  Personal history of tobacco use  -     CT Lung Screening; Future  Need for prophylactic vaccination against diphtheria-tetanus-pertussis (DTP)  -     Tdap (ADACEL) 5-2-15.5 LF-MCG/0.5 injection; Inject 0.5 mLs into the muscle once for 1 dose, Disp-0.5 mL, R-0Normal  Need for prophylactic vaccination and inoculation against varicella  -     zoster recombinant adjuvanted vaccine [facility]) 50 MCG/0.5ML SUSR injection; Inject 0.5 mLs into the muscle once for 1 dose, Disp-0.5 mL, R-1Normal  Recommendations for Preventive Services Due: see orders and patient instructions/AVS.  Recommended screening schedule for the next 5-10 years is provided to the patient in written form: see Patient Instructions/AVS.     No follow-ups on file.      Subjective       Patient's complete

## 2023-08-24 ENCOUNTER — TELEPHONE (OUTPATIENT)
Dept: FAMILY MEDICINE CLINIC | Facility: CLINIC | Age: 73
End: 2023-08-24

## 2023-08-24 DIAGNOSIS — J44.9 CHRONIC OBSTRUCTIVE PULMONARY DISEASE, UNSPECIFIED COPD TYPE (HCC): ICD-10-CM

## 2023-08-24 RX ORDER — FLUTICASONE FUROATE, UMECLIDINIUM BROMIDE AND VILANTEROL TRIFENATATE 100; 62.5; 25 UG/1; UG/1; UG/1
1 POWDER RESPIRATORY (INHALATION) DAILY
Qty: 1 EACH | Refills: 5 | Status: SHIPPED | OUTPATIENT
Start: 2023-08-24

## 2023-08-24 NOTE — TELEPHONE ENCOUNTER
Pt needs the following refill:    [id number] ( Trelegy Ellipta)    Please use [employer].     Thank you

## 2023-09-05 ENCOUNTER — OFFICE VISIT (OUTPATIENT)
Age: 73
End: 2023-09-05
Payer: MEDICARE

## 2023-09-05 VITALS
HEART RATE: 111 BPM | HEIGHT: 70 IN | SYSTOLIC BLOOD PRESSURE: 120 MMHG | DIASTOLIC BLOOD PRESSURE: 72 MMHG | BODY MASS INDEX: 15.09 KG/M2 | WEIGHT: 105.4 LBS

## 2023-09-05 DIAGNOSIS — I25.10 CORONARY ARTERY DISEASE INVOLVING NATIVE CORONARY ARTERY OF NATIVE HEART WITHOUT ANGINA PECTORIS: Primary | ICD-10-CM

## 2023-09-05 DIAGNOSIS — R00.0 TACHYCARDIA: ICD-10-CM

## 2023-09-05 DIAGNOSIS — I10 ESSENTIAL (PRIMARY) HYPERTENSION: ICD-10-CM

## 2023-09-05 DIAGNOSIS — E78.2 MIXED HYPERLIPIDEMIA: ICD-10-CM

## 2023-09-05 PROCEDURE — 3074F SYST BP LT 130 MM HG: CPT | Performed by: INTERNAL MEDICINE

## 2023-09-05 PROCEDURE — 93000 ELECTROCARDIOGRAM COMPLETE: CPT | Performed by: INTERNAL MEDICINE

## 2023-09-05 PROCEDURE — 1123F ACP DISCUSS/DSCN MKR DOCD: CPT | Performed by: INTERNAL MEDICINE

## 2023-09-05 PROCEDURE — 3078F DIAST BP <80 MM HG: CPT | Performed by: INTERNAL MEDICINE

## 2023-09-05 PROCEDURE — 99214 OFFICE O/P EST MOD 30 MIN: CPT | Performed by: INTERNAL MEDICINE

## 2023-09-05 RX ORDER — METOPROLOL SUCCINATE 25 MG/1
25 TABLET, EXTENDED RELEASE ORAL DAILY
Qty: 90 TABLET | Refills: 3 | Status: SHIPPED | OUTPATIENT
Start: 2023-09-05

## 2023-09-05 ASSESSMENT — ENCOUNTER SYMPTOMS
HEMATOCHEZIA: 0
DOUBLE VISION: 0
HEMATEMESIS: 0
ABDOMINAL PAIN: 0
HEMOPTYSIS: 0
EYE REDNESS: 0
WHEEZING: 0
HOARSE VOICE: 0
STRIDOR: 0

## 2023-09-05 NOTE — PROGRESS NOTES
pressures are borderline low. On low-dose atenolol-switched over to metoprolol succinate-less influence on blood pressure    Mixed hyperlipidemia  Has done well with a combination of atorvastatin 40 mg daily and Zetia 10 mg once daily-continue both these. Tachycardia  -     metoprolol succinate (TOPROL XL) 25 MG extended release tablet; Take 1 tablet by mouth daily  He has inappropriate sinus tachycardia-has dealt with this for years. Used to be on atenolol 50 mg daily for many years and the dose was cut down due to hypotension. Overall Impression  -Changes made with medical therapy: Discontinued atenolol and instead I started him on metoprolol succinate once daily. This should hopefully bring down his heart rates without dropping his blood pressure much. I would like his heart rates at least on average below 100 bpm.  He will monitor them. Lipids are much better controlled. Lab work reviewed in detail and summarized. No complaints of any angina. I went over his last stress test and echo with him from September 2021. No strong reason to repeat 1 this year. Counseled him on importance of improving his protein intake. Somewhat of routine cardiovascular exercise and light weight training would also be beneficial for him. On this date I have spent 45 minutes personally reviewing previous notes/outside records, test results and face to face time with the patient discussing the diagnosis and importance of compliance with the treatment plan as well as documenting on the day of the visit. Elements of this note have been dictated using speech recognition software. As a result, errors of speech recognition may have occurred. Return in about 6 months (around 3/5/2024) for cad, tachycardia, hld. Thank you for allowing us to participate in the care of your patient. If you have any further questions, please do not hesitate to contact us.   Sincerely,        Ynes Henderson MD

## 2023-09-07 ENCOUNTER — OFFICE VISIT (OUTPATIENT)
Dept: FAMILY MEDICINE CLINIC | Facility: CLINIC | Age: 73
End: 2023-09-07
Payer: MEDICARE

## 2023-09-07 VITALS
WEIGHT: 106.6 LBS | HEART RATE: 100 BPM | SYSTOLIC BLOOD PRESSURE: 128 MMHG | DIASTOLIC BLOOD PRESSURE: 86 MMHG | HEIGHT: 70 IN | OXYGEN SATURATION: 96 % | TEMPERATURE: 98 F | BODY MASS INDEX: 15.26 KG/M2

## 2023-09-07 DIAGNOSIS — I25.10 ATHEROSCLEROSIS OF NATIVE CORONARY ARTERY OF NATIVE HEART WITHOUT ANGINA PECTORIS: ICD-10-CM

## 2023-09-07 DIAGNOSIS — J44.9 CHRONIC OBSTRUCTIVE PULMONARY DISEASE, UNSPECIFIED COPD TYPE (HCC): ICD-10-CM

## 2023-09-07 DIAGNOSIS — J96.11 CHRONIC RESPIRATORY FAILURE WITH HYPOXIA (HCC): ICD-10-CM

## 2023-09-07 DIAGNOSIS — E78.2 MIXED HYPERLIPIDEMIA: ICD-10-CM

## 2023-09-07 DIAGNOSIS — Z72.0 TOBACCO ABUSE: ICD-10-CM

## 2023-09-07 DIAGNOSIS — L82.0 INFLAMED SEBORRHEIC KERATOSIS: ICD-10-CM

## 2023-09-07 DIAGNOSIS — I10 ESSENTIAL HYPERTENSION, BENIGN: Primary | ICD-10-CM

## 2023-09-07 LAB
ALBUMIN SERPL-MCNC: 3.8 G/DL (ref 3.2–4.6)
ALBUMIN/GLOB SERPL: 1.1 (ref 0.4–1.6)
ALP SERPL-CCNC: 86 U/L (ref 50–136)
ALT SERPL-CCNC: 25 U/L (ref 12–65)
ANION GAP SERPL CALC-SCNC: 2 MMOL/L (ref 2–11)
AST SERPL-CCNC: 19 U/L (ref 15–37)
BASOPHILS # BLD: 0 K/UL (ref 0–0.2)
BASOPHILS NFR BLD: 0 % (ref 0–2)
BILIRUB SERPL-MCNC: 1.2 MG/DL (ref 0.2–1.1)
BUN SERPL-MCNC: 14 MG/DL (ref 8–23)
CALCIUM SERPL-MCNC: 9.4 MG/DL (ref 8.3–10.4)
CHLORIDE SERPL-SCNC: 102 MMOL/L (ref 101–110)
CHOLEST SERPL-MCNC: 124 MG/DL
CO2 SERPL-SCNC: 37 MMOL/L (ref 21–32)
CREAT SERPL-MCNC: 0.8 MG/DL (ref 0.8–1.5)
DIFFERENTIAL METHOD BLD: ABNORMAL
EOSINOPHIL # BLD: 0.2 K/UL (ref 0–0.8)
EOSINOPHIL NFR BLD: 2 % (ref 0.5–7.8)
ERYTHROCYTE [DISTWIDTH] IN BLOOD BY AUTOMATED COUNT: 13.7 % (ref 11.9–14.6)
GLOBULIN SER CALC-MCNC: 3.5 G/DL (ref 2.8–4.5)
GLUCOSE SERPL-MCNC: 105 MG/DL (ref 65–100)
HCT VFR BLD AUTO: 43 % (ref 41.1–50.3)
HDLC SERPL-MCNC: 58 MG/DL (ref 40–60)
HDLC SERPL: 2.1
HGB BLD-MCNC: 14 G/DL (ref 13.6–17.2)
IMM GRANULOCYTES # BLD AUTO: 0 K/UL (ref 0–0.5)
IMM GRANULOCYTES NFR BLD AUTO: 0 % (ref 0–5)
LDLC SERPL CALC-MCNC: 52.4 MG/DL
LYMPHOCYTES # BLD: 1.6 K/UL (ref 0.5–4.6)
LYMPHOCYTES NFR BLD: 16 % (ref 13–44)
MCH RBC QN AUTO: 30.6 PG (ref 26.1–32.9)
MCHC RBC AUTO-ENTMCNC: 32.6 G/DL (ref 31.4–35)
MCV RBC AUTO: 94.1 FL (ref 82–102)
MONOCYTES # BLD: 0.8 K/UL (ref 0.1–1.3)
MONOCYTES NFR BLD: 8 % (ref 4–12)
NEUTS SEG # BLD: 7.5 K/UL (ref 1.7–8.2)
NEUTS SEG NFR BLD: 74 % (ref 43–78)
NRBC # BLD: 0 K/UL (ref 0–0.2)
PLATELET # BLD AUTO: 125 K/UL (ref 150–450)
PMV BLD AUTO: 11 FL (ref 9.4–12.3)
POTASSIUM SERPL-SCNC: 3.9 MMOL/L (ref 3.5–5.1)
PROT SERPL-MCNC: 7.3 G/DL (ref 6.3–8.2)
RBC # BLD AUTO: 4.57 M/UL (ref 4.23–5.6)
SODIUM SERPL-SCNC: 141 MMOL/L (ref 133–143)
TRIGL SERPL-MCNC: 68 MG/DL (ref 35–150)
VLDLC SERPL CALC-MCNC: 13.6 MG/DL (ref 6–23)
WBC # BLD AUTO: 10.1 K/UL (ref 4.3–11.1)

## 2023-09-07 PROCEDURE — 3074F SYST BP LT 130 MM HG: CPT | Performed by: FAMILY MEDICINE

## 2023-09-07 PROCEDURE — 99214 OFFICE O/P EST MOD 30 MIN: CPT | Performed by: FAMILY MEDICINE

## 2023-09-07 PROCEDURE — 3079F DIAST BP 80-89 MM HG: CPT | Performed by: FAMILY MEDICINE

## 2023-09-07 PROCEDURE — 1123F ACP DISCUSS/DSCN MKR DOCD: CPT | Performed by: FAMILY MEDICINE

## 2023-09-07 ASSESSMENT — ENCOUNTER SYMPTOMS
DIARRHEA: 0
COUGH: 0
VOMITING: 0
NAUSEA: 0
CHEST TIGHTNESS: 0
SHORTNESS OF BREATH: 0

## 2023-09-07 ASSESSMENT — PATIENT HEALTH QUESTIONNAIRE - PHQ9
1. LITTLE INTEREST OR PLEASURE IN DOING THINGS: 0
2. FEELING DOWN, DEPRESSED OR HOPELESS: 0
SUM OF ALL RESPONSES TO PHQ QUESTIONS 1-9: 0
SUM OF ALL RESPONSES TO PHQ QUESTIONS 1-9: 0
SUM OF ALL RESPONSES TO PHQ9 QUESTIONS 1 & 2: 0
SUM OF ALL RESPONSES TO PHQ QUESTIONS 1-9: 0
SUM OF ALL RESPONSES TO PHQ QUESTIONS 1-9: 0

## 2023-09-07 ASSESSMENT — COPD QUESTIONNAIRES: COPD: 1

## 2023-09-07 NOTE — PROGRESS NOTES
Mood and Affect: Mood normal.         Behavior: Behavior normal.              An electronic signature was used to authenticate this note.     --Laya Gibson MD

## 2023-09-13 ENCOUNTER — TELEPHONE (OUTPATIENT)
Dept: PULMONOLOGY | Age: 73
End: 2023-09-13

## 2023-09-25 ENCOUNTER — OFFICE VISIT (OUTPATIENT)
Dept: FAMILY MEDICINE CLINIC | Facility: CLINIC | Age: 73
End: 2023-09-25
Payer: MEDICARE

## 2023-09-25 VITALS
HEART RATE: 99 BPM | HEIGHT: 70 IN | TEMPERATURE: 97 F | DIASTOLIC BLOOD PRESSURE: 76 MMHG | RESPIRATION RATE: 18 BRPM | BODY MASS INDEX: 15.32 KG/M2 | OXYGEN SATURATION: 98 % | SYSTOLIC BLOOD PRESSURE: 118 MMHG | WEIGHT: 107 LBS

## 2023-09-25 DIAGNOSIS — J44.9 CHRONIC OBSTRUCTIVE PULMONARY DISEASE, UNSPECIFIED COPD TYPE (HCC): ICD-10-CM

## 2023-09-25 DIAGNOSIS — L82.0 INFLAMED SEBORRHEIC KERATOSIS: Primary | ICD-10-CM

## 2023-09-25 PROCEDURE — 99213 OFFICE O/P EST LOW 20 MIN: CPT | Performed by: FAMILY MEDICINE

## 2023-09-25 PROCEDURE — 3078F DIAST BP <80 MM HG: CPT | Performed by: FAMILY MEDICINE

## 2023-09-25 PROCEDURE — 3074F SYST BP LT 130 MM HG: CPT | Performed by: FAMILY MEDICINE

## 2023-09-25 PROCEDURE — 1123F ACP DISCUSS/DSCN MKR DOCD: CPT | Performed by: FAMILY MEDICINE

## 2023-09-25 PROCEDURE — 17110 DESTRUCTION B9 LES UP TO 14: CPT | Performed by: FAMILY MEDICINE

## 2023-09-25 RX ORDER — FLUTICASONE PROPIONATE AND SALMETEROL 250; 50 UG/1; UG/1
1 POWDER RESPIRATORY (INHALATION) EVERY 12 HOURS
Qty: 1 EACH | Refills: 3 | Status: SHIPPED | OUTPATIENT
Start: 2023-09-25

## 2023-09-25 ASSESSMENT — ENCOUNTER SYMPTOMS
WHEEZING: 1
NAUSEA: 0
DIARRHEA: 0
COUGH: 0
SHORTNESS OF BREATH: 1
CHEST TIGHTNESS: 1
VOMITING: 0

## 2023-09-25 ASSESSMENT — PATIENT HEALTH QUESTIONNAIRE - PHQ9
SUM OF ALL RESPONSES TO PHQ QUESTIONS 1-9: 0
SUM OF ALL RESPONSES TO PHQ9 QUESTIONS 1 & 2: 0
1. LITTLE INTEREST OR PLEASURE IN DOING THINGS: 0
SUM OF ALL RESPONSES TO PHQ QUESTIONS 1-9: 0
2. FEELING DOWN, DEPRESSED OR HOPELESS: 0
SUM OF ALL RESPONSES TO PHQ QUESTIONS 1-9: 0
SUM OF ALL RESPONSES TO PHQ QUESTIONS 1-9: 0

## 2023-09-25 ASSESSMENT — COPD QUESTIONNAIRES: COPD: 1

## 2023-09-25 NOTE — PROGRESS NOTES
Betty Meehan (:  1950) is a 68 y.o. male,Established patient, here for evaluation of the following chief complaint(s): Other (Keratosis removal. ) and COPD (In doughnut hole and cannot afford Trellegy any longer)         ASSESSMENT/PLAN:  1. Inflamed seborrheic keratosis= 1.5x1cm left dorsal forearm- tender at times, treated x 3 with cryo- tolerated well, no complications  -     DESTRUC BENIGN LESION, UP TO 14 LESIONS  Fu with any concerns  2. Chronic obstructive pulmonary disease, unspecified COPD type ([address])- wants to go back to Advair generic due to cost  -     fluticasone-salmeterol (ADVAIR) 250-50 MCG/ACT AEPB diskus inhaler; Inhale 1 puff into the lungs in the morning and 1 puff in the evening., Disp-1 each, R-3Normal        -     samples of Trellegy given #4- use qod alternating with the advair until end of the year    Return in about 2 months (around 2023) for fasting chronic care. Subjective   SUBJECTIVE/OBJECTIVE:  COPD  He complains of chest tightness, shortness of breath and wheezing. There is no cough. This is a chronic problem. The current episode started more than 1 year ago. The problem occurs constantly. The problem has been unchanged. Pertinent negatives include no chest pain. His symptoms are aggravated by any activity, climbing stairs, exercise and minimal activity. His symptoms are alleviated by rest and beta-agonist. He reports moderate improvement on treatment. Risk factors for lung disease include smoking/tobacco exposure. Other  This is a chronic problem. The current episode started more than 1 year ago. The problem occurs constantly. The problem has been gradually worsening. Pertinent negatives include no chest pain, chills, coughing, fatigue, nausea or vomiting. Review of Systems   Constitutional:  Negative for chills and fatigue. Respiratory:  Positive for shortness of breath and wheezing. Negative for cough.     Cardiovascular:  Negative for chest pain